# Patient Record
Sex: FEMALE | Race: ASIAN | Employment: OTHER | URBAN - METROPOLITAN AREA
[De-identification: names, ages, dates, MRNs, and addresses within clinical notes are randomized per-mention and may not be internally consistent; named-entity substitution may affect disease eponyms.]

---

## 2022-04-29 ENCOUNTER — APPOINTMENT (OUTPATIENT)
Dept: VASCULAR SURGERY | Age: 88
End: 2022-04-29
Attending: EMERGENCY MEDICINE
Payer: MEDICARE

## 2022-04-29 ENCOUNTER — HOSPITAL ENCOUNTER (EMERGENCY)
Age: 88
Discharge: HOME | End: 2022-04-30
Attending: EMERGENCY MEDICINE | Admitting: EMERGENCY MEDICINE
Payer: MEDICARE

## 2022-04-29 ENCOUNTER — APPOINTMENT (OUTPATIENT)
Dept: GENERAL RADIOLOGY | Age: 88
End: 2022-04-29
Attending: EMERGENCY MEDICINE
Payer: MEDICARE

## 2022-04-29 DIAGNOSIS — M79.89 LEG SWELLING: Primary | ICD-10-CM

## 2022-04-29 LAB
ALBUMIN SERPL-MCNC: 3.4 G/DL (ref 3.5–5)
ALBUMIN/GLOB SERPL: 0.8 {RATIO} (ref 1.1–2.2)
ALP SERPL-CCNC: 74 U/L (ref 45–117)
ALT SERPL-CCNC: 40 U/L (ref 12–78)
ANION GAP SERPL CALC-SCNC: 2 MMOL/L (ref 5–15)
AST SERPL-CCNC: 62 U/L (ref 15–37)
BASOPHILS # BLD: 0 K/UL (ref 0–0.1)
BASOPHILS NFR BLD: 1 % (ref 0–1)
BILIRUB SERPL-MCNC: 0.4 MG/DL (ref 0.2–1)
BUN SERPL-MCNC: 24 MG/DL (ref 6–20)
BUN/CREAT SERPL: 38 (ref 12–20)
CALCIUM SERPL-MCNC: 9 MG/DL (ref 8.5–10.1)
CHLORIDE SERPL-SCNC: 102 MMOL/L (ref 97–108)
CO2 SERPL-SCNC: 29 MMOL/L (ref 21–32)
COMMENT, HOLDF: NORMAL
CREAT SERPL-MCNC: 0.63 MG/DL (ref 0.55–1.02)
DIFFERENTIAL METHOD BLD: ABNORMAL
EOSINOPHIL # BLD: 0.2 K/UL (ref 0–0.4)
EOSINOPHIL NFR BLD: 2 % (ref 0–7)
ERYTHROCYTE [DISTWIDTH] IN BLOOD BY AUTOMATED COUNT: 14.1 % (ref 11.5–14.5)
GLOBULIN SER CALC-MCNC: 4.5 G/DL (ref 2–4)
GLUCOSE SERPL-MCNC: 109 MG/DL (ref 65–100)
HCT VFR BLD AUTO: 34.9 % (ref 35–47)
HGB BLD-MCNC: 11.4 G/DL (ref 11.5–16)
IMM GRANULOCYTES # BLD AUTO: 0 K/UL (ref 0–0.04)
IMM GRANULOCYTES NFR BLD AUTO: 0 % (ref 0–0.5)
LYMPHOCYTES # BLD: 1.4 K/UL (ref 0.8–3.5)
LYMPHOCYTES NFR BLD: 19 % (ref 12–49)
MCH RBC QN AUTO: 31 PG (ref 26–34)
MCHC RBC AUTO-ENTMCNC: 32.7 G/DL (ref 30–36.5)
MCV RBC AUTO: 94.8 FL (ref 80–99)
MONOCYTES # BLD: 0.6 K/UL (ref 0–1)
MONOCYTES NFR BLD: 8 % (ref 5–13)
NEUTS SEG # BLD: 5.3 K/UL (ref 1.8–8)
NEUTS SEG NFR BLD: 70 % (ref 32–75)
NRBC # BLD: 0 K/UL (ref 0–0.01)
NRBC BLD-RTO: 0 PER 100 WBC
PLATELET # BLD AUTO: 237 K/UL (ref 150–400)
PMV BLD AUTO: 10.9 FL (ref 8.9–12.9)
POTASSIUM SERPL-SCNC: 5.2 MMOL/L (ref 3.5–5.1)
PROT SERPL-MCNC: 7.9 G/DL (ref 6.4–8.2)
RBC # BLD AUTO: 3.68 M/UL (ref 3.8–5.2)
SAMPLES BEING HELD,HOLD: NORMAL
SODIUM SERPL-SCNC: 133 MMOL/L (ref 136–145)
TROPONIN-HIGH SENSITIVITY: 8 NG/L (ref 0–51)
WBC # BLD AUTO: 7.5 K/UL (ref 3.6–11)

## 2022-04-29 PROCEDURE — 36415 COLL VENOUS BLD VENIPUNCTURE: CPT

## 2022-04-29 PROCEDURE — 93971 EXTREMITY STUDY: CPT

## 2022-04-29 PROCEDURE — 84484 ASSAY OF TROPONIN QUANT: CPT

## 2022-04-29 PROCEDURE — 93005 ELECTROCARDIOGRAM TRACING: CPT

## 2022-04-29 PROCEDURE — 71046 X-RAY EXAM CHEST 2 VIEWS: CPT

## 2022-04-29 PROCEDURE — 80053 COMPREHEN METABOLIC PANEL: CPT

## 2022-04-29 PROCEDURE — 85025 COMPLETE CBC W/AUTO DIFF WBC: CPT

## 2022-04-29 PROCEDURE — 99285 EMERGENCY DEPT VISIT HI MDM: CPT

## 2022-04-29 PROCEDURE — 83880 ASSAY OF NATRIURETIC PEPTIDE: CPT

## 2022-04-29 NOTE — ED TRIAGE NOTES
TRIAGE NOTE:  Patient arrives ambulatory with cane. With c/o right foot, and calf swelling that started 3 days ago.

## 2022-04-30 VITALS
TEMPERATURE: 97.8 F | SYSTOLIC BLOOD PRESSURE: 159 MMHG | RESPIRATION RATE: 11 BRPM | OXYGEN SATURATION: 99 % | DIASTOLIC BLOOD PRESSURE: 54 MMHG | HEART RATE: 62 BPM

## 2022-04-30 LAB
ATRIAL RATE: 60 BPM
BNP SERPL-MCNC: 571 PG/ML
CALCULATED P AXIS, ECG09: 74 DEGREES
CALCULATED R AXIS, ECG10: 89 DEGREES
CALCULATED T AXIS, ECG11: 72 DEGREES
DIAGNOSIS, 93000: NORMAL
P-R INTERVAL, ECG05: 174 MS
Q-T INTERVAL, ECG07: 416 MS
QRS DURATION, ECG06: 80 MS
QTC CALCULATION (BEZET), ECG08: 416 MS
VENTRICULAR RATE, ECG03: 60 BPM

## 2022-04-30 RX ORDER — FUROSEMIDE 20 MG/1
20 TABLET ORAL DAILY
Qty: 7 TABLET | Refills: 0 | Status: SHIPPED | OUTPATIENT
Start: 2022-04-30 | End: 2022-05-07

## 2022-04-30 RX ORDER — FUROSEMIDE 20 MG/1
20 TABLET ORAL DAILY
Qty: 7 TABLET | Refills: 0 | Status: SHIPPED | OUTPATIENT
Start: 2022-04-30 | End: 2022-04-30 | Stop reason: SDUPTHER

## 2022-04-30 NOTE — ED PROVIDER NOTES
HPI     72-year-old female with a history of hypertension presents emergency department with concern for right leg swelling. Patient's son translates and states she had a Holter monitor placed about 8 days ago for dizziness. Dizziness has been a longstanding problem for her. She states she has discomfort around the site of the loop monitor and it hurts, she noticed it mostly when she lays down. She denies shortness of breath. She states she is actually had bilateral leg swelling since a loop monitor was placed but feels like it is more swollen on the right. She is not on a diuretic. She states she is urinating normally. Denies fever cough. Denies nausea vomiting. Her primary doctor sent her in to rule out a blood clot. There is been no travel. There is no history of DVT or PE. Past Medical History:   Diagnosis Date    Hypertension        No past surgical history on file. No family history on file. Social History     Socioeconomic History    Marital status:      Spouse name: Not on file    Number of children: Not on file    Years of education: Not on file    Highest education level: Not on file   Occupational History    Not on file   Tobacco Use    Smoking status: Not on file    Smokeless tobacco: Not on file   Substance and Sexual Activity    Alcohol use: Not on file    Drug use: Not on file    Sexual activity: Not on file   Other Topics Concern    Not on file   Social History Narrative    Not on file     Social Determinants of Health     Financial Resource Strain:     Difficulty of Paying Living Expenses: Not on file   Food Insecurity:     Worried About Running Out of Food in the Last Year: Not on file    Regine of Food in the Last Year: Not on file   Transportation Needs:     Lack of Transportation (Medical): Not on file    Lack of Transportation (Non-Medical):  Not on file   Physical Activity:     Days of Exercise per Week: Not on file    Minutes of Exercise per Session: Not on file   Stress:     Feeling of Stress : Not on file   Social Connections:     Frequency of Communication with Friends and Family: Not on file    Frequency of Social Gatherings with Friends and Family: Not on file    Attends [employer] Services: Not on file    Active Member of Clubs or Organizations: Not on file    Attends Club or Organization Meetings: Not on file    Marital Status: Not on file   Intimate Partner Violence:     Fear of Current or Ex-Partner: Not on file    Emotionally Abused: Not on file    Physically Abused: Not on file    Sexually Abused: Not on file   Housing Stability:     Unable to Pay for Housing in the Last Year: Not on file    Number of [place] in the Last Year: Not on file    Unstable Housing in the Last Year: Not on file         ALLERGIES: Patient has no known allergies. Review of Systems   Constitutional: Negative for fever. HENT: Negative for congestion. Eyes: Negative for visual disturbance. Respiratory: Negative for cough and shortness of breath. Cardiovascular: Positive for chest pain. Gastrointestinal: Negative for abdominal pain, nausea and vomiting. Endocrine: Negative for polyuria. Genitourinary: Negative for dysuria. Musculoskeletal: Negative for gait problem. Neurological: Negative for headaches. Psychiatric/Behavioral: Negative for dysphoric mood. Vitals:    04/29/22 1956   BP: (!) 183/75   Pulse: 67   Resp: 18   Temp: 97.8 °F (36.6 °C)   SpO2: 99%            Physical Exam  Constitutional:       General: She is not in acute distress. Appearance: She is well-developed. HENT:      Head: Normocephalic and atraumatic. Mouth/Throat:      Pharynx: No oropharyngeal exudate. Eyes:      General: No scleral icterus. Right eye: No discharge. Left eye: No discharge. Pupils: Pupils are equal, round, and reactive to light. Neck:      Vascular: No JVD.    Cardiovascular:      Rate and Rhythm: Normal rate and regular rhythm. Heart sounds: Normal heart sounds. No murmur heard. Pulmonary:      Effort: Pulmonary effort is normal. No respiratory distress. Breath sounds: Normal breath sounds. No stridor. No wheezing or rales. Chest:      Chest wall: No tenderness. Abdominal:      General: Bowel sounds are normal. There is no distension. Palpations: Abdomen is soft. There is no mass. Tenderness: There is no abdominal tenderness. There is no guarding or rebound. Musculoskeletal:         General: Normal range of motion. Cervical back: Normal range of motion and neck supple. Right lower leg: Edema present. Left lower leg: Edema present. Skin:     General: Skin is warm and dry. Capillary Refill: Capillary refill takes less than 2 seconds. Findings: No rash. Neurological:      Mental Status: She is oriented to person, place, and time. Psychiatric:         Behavior: Behavior normal.         Thought Content: Thought content normal.         Judgment: Judgment normal.          MDM       Procedures        ED EKG interpretation:  Rhythm: normal sinus rhythm; and regular . Rate (approx.): 60; Axis: normal; P wave: normal; QRS interval: normal ; ST/T wave: non-specific changes; . This EKG was interpreted by Syeda Colby MD,ED Provider. Work up is reassuring. Vitals are stable. CXR is clear, lungs are clear and denies SOB/orthopnea. BNP is mildly above normal at 571. Doppler is neg. Will start lasix po, encouraged close follow up w pcp next week for echo.  Return precautions provided

## 2022-04-30 NOTE — ED NOTES
I have reviewed discharge instructions with the patient and family member. The patient and family member verbalized understanding.

## 2022-04-30 NOTE — DISCHARGE INSTRUCTIONS
Work up in the emergency department was reassuring except for a mildly elevated BNP which can be seen with heart failure. I have sent a prescription for lasix to your pharmacy. Please call your doctor on Monday, let them know you were in the ED (THey should be able to review my note from this visit as well as the lab work and xrays)  In my opinion you should get an echocardiogram to evaluate heart pump function.      If you develop shortness of breath, especially when laying flat or with exertion, or worsening chest pain, return to the ED>

## 2024-12-24 ENCOUNTER — APPOINTMENT (OUTPATIENT)
Facility: HOSPITAL | Age: 89
End: 2024-12-24
Payer: MEDICARE

## 2024-12-24 ENCOUNTER — HOSPITAL ENCOUNTER (INPATIENT)
Facility: HOSPITAL | Age: 89
LOS: 4 days | Discharge: HOME HEALTH | End: 2024-12-28
Attending: EMERGENCY MEDICINE | Admitting: FAMILY MEDICINE
Payer: MEDICARE

## 2024-12-24 ENCOUNTER — APPOINTMENT (OUTPATIENT)
Facility: HOSPITAL | Age: 89
End: 2024-12-24
Attending: FAMILY MEDICINE
Payer: MEDICARE

## 2024-12-24 DIAGNOSIS — E87.1 HYPONATREMIA: Primary | ICD-10-CM

## 2024-12-24 DIAGNOSIS — R06.02 SHORTNESS OF BREATH: ICD-10-CM

## 2024-12-24 DIAGNOSIS — R60.0 BILATERAL LOWER EXTREMITY EDEMA: ICD-10-CM

## 2024-12-24 LAB
ALBUMIN SERPL-MCNC: 2.9 G/DL (ref 3.5–5)
ALBUMIN/GLOB SERPL: 0.8 (ref 1.1–2.2)
ALP SERPL-CCNC: 88 U/L (ref 45–117)
ALT SERPL-CCNC: 32 U/L (ref 12–78)
ANION GAP SERPL CALC-SCNC: 4 MMOL/L (ref 2–12)
ANION GAP SERPL CALC-SCNC: 6 MMOL/L (ref 2–12)
APPEARANCE UR: CLEAR
AST SERPL-CCNC: 48 U/L (ref 15–37)
BACTERIA URNS QL MICRO: NEGATIVE /HPF
BASOPHILS # BLD: 0 K/UL (ref 0–0.1)
BASOPHILS NFR BLD: 0 % (ref 0–1)
BILIRUB SERPL-MCNC: 0.5 MG/DL (ref 0.2–1)
BILIRUB UR QL: NEGATIVE
BUN SERPL-MCNC: 20 MG/DL (ref 6–20)
BUN SERPL-MCNC: 25 MG/DL (ref 6–20)
BUN/CREAT SERPL: 50 (ref 12–20)
BUN/CREAT SERPL: 50 (ref 12–20)
CALCIUM SERPL-MCNC: 7.9 MG/DL (ref 8.5–10.1)
CALCIUM SERPL-MCNC: 8.4 MG/DL (ref 8.5–10.1)
CHLORIDE SERPL-SCNC: 88 MMOL/L (ref 97–108)
CHLORIDE SERPL-SCNC: 92 MMOL/L (ref 97–108)
CO2 SERPL-SCNC: 29 MMOL/L (ref 21–32)
CO2 SERPL-SCNC: 29 MMOL/L (ref 21–32)
COLOR UR: ABNORMAL
COMMENT:: NORMAL
CREAT SERPL-MCNC: 0.4 MG/DL (ref 0.55–1.02)
CREAT SERPL-MCNC: 0.5 MG/DL (ref 0.55–1.02)
DIFFERENTIAL METHOD BLD: ABNORMAL
EOSINOPHIL # BLD: 0.1 K/UL (ref 0–0.4)
EOSINOPHIL NFR BLD: 1 % (ref 0–7)
EPITH CASTS URNS QL MICRO: ABNORMAL /LPF
ERYTHROCYTE [DISTWIDTH] IN BLOOD BY AUTOMATED COUNT: 14 % (ref 11.5–14.5)
FLUAV RNA SPEC QL NAA+PROBE: NOT DETECTED
FLUBV RNA SPEC QL NAA+PROBE: NOT DETECTED
FOLATE SERPL-MCNC: 26.9 NG/ML (ref 5–21)
GLOBULIN SER CALC-MCNC: 3.7 G/DL (ref 2–4)
GLUCOSE SERPL-MCNC: 147 MG/DL (ref 65–100)
GLUCOSE SERPL-MCNC: 86 MG/DL (ref 65–100)
GLUCOSE UR STRIP.AUTO-MCNC: NEGATIVE MG/DL
HCT VFR BLD AUTO: 31.1 % (ref 35–47)
HGB BLD-MCNC: 10.6 G/DL (ref 11.5–16)
HGB UR QL STRIP: ABNORMAL
HYALINE CASTS URNS QL MICRO: ABNORMAL /LPF (ref 0–5)
IMM GRANULOCYTES # BLD AUTO: 0 K/UL (ref 0–0.04)
IMM GRANULOCYTES NFR BLD AUTO: 0 % (ref 0–0.5)
IRON SATN MFR SERPL: 54 % (ref 20–50)
IRON SERPL-MCNC: 192 UG/DL (ref 35–150)
KETONES UR QL STRIP.AUTO: NEGATIVE MG/DL
LEUKOCYTE ESTERASE UR QL STRIP.AUTO: NEGATIVE
LYMPHOCYTES # BLD: 0.8 K/UL (ref 0.8–3.5)
LYMPHOCYTES NFR BLD: 14 % (ref 12–49)
MCH RBC QN AUTO: 31.1 PG (ref 26–34)
MCHC RBC AUTO-ENTMCNC: 34.1 G/DL (ref 30–36.5)
MCV RBC AUTO: 91.2 FL (ref 80–99)
MONOCYTES # BLD: 0.8 K/UL (ref 0–1)
MONOCYTES NFR BLD: 14 % (ref 5–13)
NEUTS SEG # BLD: 4.1 K/UL (ref 1.8–8)
NEUTS SEG NFR BLD: 71 % (ref 32–75)
NITRITE UR QL STRIP.AUTO: NEGATIVE
NRBC # BLD: 0 K/UL (ref 0–0.01)
NRBC BLD-RTO: 0 PER 100 WBC
NT PRO BNP: 2650 PG/ML
OSMOLALITY SERPL: 259 MOSM/KG H2O
OSMOLALITY UR: 353 MOSM/KG H2O
PH UR STRIP: 7 (ref 5–8)
PLATELET # BLD AUTO: 244 K/UL (ref 150–400)
PMV BLD AUTO: 9.2 FL (ref 8.9–12.9)
POTASSIUM SERPL-SCNC: 3.6 MMOL/L (ref 3.5–5.1)
POTASSIUM SERPL-SCNC: 4.5 MMOL/L (ref 3.5–5.1)
PROT SERPL-MCNC: 6.6 G/DL (ref 6.4–8.2)
PROT UR STRIP-MCNC: NEGATIVE MG/DL
RBC # BLD AUTO: 3.41 M/UL (ref 3.8–5.2)
RBC #/AREA URNS HPF: ABNORMAL /HPF (ref 0–5)
SARS-COV-2 RNA RESP QL NAA+PROBE: NOT DETECTED
SODIUM SERPL-SCNC: 121 MMOL/L (ref 136–145)
SODIUM SERPL-SCNC: 127 MMOL/L (ref 136–145)
SODIUM UR-SCNC: 63 MMOL/L
SOURCE: NORMAL
SP GR UR REFRACTOMETRY: 1.01 (ref 1–1.03)
SPECIMEN HOLD: NORMAL
TIBC SERPL-MCNC: 353 UG/DL (ref 250–450)
UROBILINOGEN UR QL STRIP.AUTO: 0.2 EU/DL (ref 0.2–1)
VIT B12 SERPL-MCNC: 1479 PG/ML (ref 193–986)
WBC # BLD AUTO: 5.8 K/UL (ref 3.6–11)
WBC URNS QL MICRO: ABNORMAL /HPF (ref 0–4)

## 2024-12-24 PROCEDURE — 83930 ASSAY OF BLOOD OSMOLALITY: CPT

## 2024-12-24 PROCEDURE — 81001 URINALYSIS AUTO W/SCOPE: CPT

## 2024-12-24 PROCEDURE — 83550 IRON BINDING TEST: CPT

## 2024-12-24 PROCEDURE — 82607 VITAMIN B-12: CPT

## 2024-12-24 PROCEDURE — 82746 ASSAY OF FOLIC ACID SERUM: CPT

## 2024-12-24 PROCEDURE — 6360000002 HC RX W HCPCS: Performed by: FAMILY MEDICINE

## 2024-12-24 PROCEDURE — 83935 ASSAY OF URINE OSMOLALITY: CPT

## 2024-12-24 PROCEDURE — 93970 EXTREMITY STUDY: CPT

## 2024-12-24 PROCEDURE — 36415 COLL VENOUS BLD VENIPUNCTURE: CPT

## 2024-12-24 PROCEDURE — 84300 ASSAY OF URINE SODIUM: CPT

## 2024-12-24 PROCEDURE — 83540 ASSAY OF IRON: CPT

## 2024-12-24 PROCEDURE — 85025 COMPLETE CBC W/AUTO DIFF WBC: CPT

## 2024-12-24 PROCEDURE — 71045 X-RAY EXAM CHEST 1 VIEW: CPT

## 2024-12-24 PROCEDURE — 2060000000 HC ICU INTERMEDIATE R&B

## 2024-12-24 PROCEDURE — 80053 COMPREHEN METABOLIC PANEL: CPT

## 2024-12-24 PROCEDURE — 99285 EMERGENCY DEPT VISIT HI MDM: CPT

## 2024-12-24 PROCEDURE — 2500000003 HC RX 250 WO HCPCS: Performed by: FAMILY MEDICINE

## 2024-12-24 PROCEDURE — 83880 ASSAY OF NATRIURETIC PEPTIDE: CPT

## 2024-12-24 PROCEDURE — 87636 SARSCOV2 & INF A&B AMP PRB: CPT

## 2024-12-24 RX ORDER — SODIUM CHLORIDE 9 MG/ML
INJECTION, SOLUTION INTRAVENOUS PRN
Status: DISCONTINUED | OUTPATIENT
Start: 2024-12-24 | End: 2024-12-28 | Stop reason: HOSPADM

## 2024-12-24 RX ORDER — ACETAMINOPHEN 650 MG/1
650 SUPPOSITORY RECTAL EVERY 6 HOURS PRN
Status: DISCONTINUED | OUTPATIENT
Start: 2024-12-24 | End: 2024-12-24

## 2024-12-24 RX ORDER — SODIUM CHLORIDE 0.9 % (FLUSH) 0.9 %
5-40 SYRINGE (ML) INJECTION EVERY 12 HOURS SCHEDULED
Status: DISCONTINUED | OUTPATIENT
Start: 2024-12-24 | End: 2024-12-28 | Stop reason: HOSPADM

## 2024-12-24 RX ORDER — ENOXAPARIN SODIUM 100 MG/ML
30 INJECTION SUBCUTANEOUS DAILY
Status: DISCONTINUED | OUTPATIENT
Start: 2024-12-24 | End: 2024-12-28 | Stop reason: HOSPADM

## 2024-12-24 RX ORDER — BENZONATATE 100 MG/1
100 CAPSULE ORAL 3 TIMES DAILY PRN
Status: DISCONTINUED | OUTPATIENT
Start: 2024-12-24 | End: 2024-12-25

## 2024-12-24 RX ORDER — ACETAMINOPHEN 325 MG/1
650 TABLET ORAL EVERY 6 HOURS PRN
Status: DISCONTINUED | OUTPATIENT
Start: 2024-12-24 | End: 2024-12-24

## 2024-12-24 RX ORDER — POLYETHYLENE GLYCOL 3350 17 G/17G
17 POWDER, FOR SOLUTION ORAL DAILY PRN
Status: DISCONTINUED | OUTPATIENT
Start: 2024-12-24 | End: 2024-12-28 | Stop reason: HOSPADM

## 2024-12-24 RX ORDER — SODIUM CHLORIDE 0.9 % (FLUSH) 0.9 %
5-40 SYRINGE (ML) INJECTION PRN
Status: DISCONTINUED | OUTPATIENT
Start: 2024-12-24 | End: 2024-12-28 | Stop reason: HOSPADM

## 2024-12-24 RX ADMIN — ENOXAPARIN SODIUM 30 MG: 100 INJECTION SUBCUTANEOUS at 22:56

## 2024-12-24 RX ADMIN — SODIUM CHLORIDE, PRESERVATIVE FREE 10 ML: 5 INJECTION INTRAVENOUS at 22:56

## 2024-12-24 ASSESSMENT — PAIN - FUNCTIONAL ASSESSMENT: PAIN_FUNCTIONAL_ASSESSMENT: NONE - DENIES PAIN

## 2024-12-24 NOTE — ED NOTES
Referred by PCP due to Na of 118 earlier today at [facility].  Feeling confused/ disoriented, fatigued/tired as of lately.  Minimal cough, denies fever.  Was recently of furosemide.  Denies chest pain/sob.      6:22 PM  I have evaluated the patient as the Provider in Rapid Medical Evaluation (RME). I have reviewed her vital signs and the triage nurse assessment. I have talked with the patient and any available family and advised that I am the provider in triage and have ordered the appropriate study to initiate their work up based on the clinical presentation during my assessment. I have advised that the patient will be accommodated in the Main ED as soon as possible. I have also requested to contact the triage nurse or myself immediately if the patient experiences any changes in their condition during this brief waiting period.  LANDY Hewitt, Bravo ESCALERA PA-C  12/24/24 1827

## 2024-12-24 NOTE — ED TRIAGE NOTES
Pt was brought to ED by son due to low sodium. Pt has been having a cough that has been on going for the past couple weeks. Denies chest pain, SOB, dizziness, nor vision changed. Pt has been having extremities swelling. Pt has gotten more confused over the last two months.

## 2024-12-25 ENCOUNTER — APPOINTMENT (OUTPATIENT)
Facility: HOSPITAL | Age: 89
End: 2024-12-25
Attending: FAMILY MEDICINE
Payer: MEDICARE

## 2024-12-25 ENCOUNTER — APPOINTMENT (OUTPATIENT)
Facility: HOSPITAL | Age: 89
End: 2024-12-25
Payer: MEDICARE

## 2024-12-25 LAB
ALBUMIN SERPL-MCNC: 2.6 G/DL (ref 3.5–5)
ALBUMIN/GLOB SERPL: 0.8 (ref 1.1–2.2)
ALP SERPL-CCNC: 78 U/L (ref 45–117)
ALT SERPL-CCNC: 24 U/L (ref 12–78)
ANION GAP SERPL CALC-SCNC: 4 MMOL/L (ref 2–12)
APTT PPP: 30.9 SEC (ref 22.1–31)
AST SERPL-CCNC: 24 U/L (ref 15–37)
BASOPHILS # BLD: 0 K/UL (ref 0–0.1)
BASOPHILS NFR BLD: 1 % (ref 0–1)
BILIRUB SERPL-MCNC: 0.5 MG/DL (ref 0.2–1)
BUN SERPL-MCNC: 19 MG/DL (ref 6–20)
BUN/CREAT SERPL: 54 (ref 12–20)
CALCIUM SERPL-MCNC: 7.8 MG/DL (ref 8.5–10.1)
CHLORIDE SERPL-SCNC: 95 MMOL/L (ref 97–108)
CHOLEST SERPL-MCNC: 114 MG/DL
CO2 SERPL-SCNC: 29 MMOL/L (ref 21–32)
CREAT SERPL-MCNC: 0.35 MG/DL (ref 0.55–1.02)
DIFFERENTIAL METHOD BLD: ABNORMAL
ECHO AO ROOT DIAM: 2.7 CM
ECHO AO ROOT INDEX: 1.97 CM/M2
ECHO AV AREA PEAK VELOCITY: 0.9 CM2
ECHO AV AREA VTI: 0.9 CM2
ECHO AV AREA/BSA PEAK VELOCITY: 0.7 CM2/M2
ECHO AV AREA/BSA VTI: 0.7 CM2/M2
ECHO AV MEAN GRADIENT: 11 MMHG
ECHO AV MEAN VELOCITY: 1.6 M/S
ECHO AV PEAK GRADIENT: 18 MMHG
ECHO AV PEAK VELOCITY: 2.1 M/S
ECHO AV VELOCITY RATIO: 0.43
ECHO AV VTI: 53.4 CM
ECHO BSA: 1.34 M2
ECHO EST RA PRESSURE: 3 MMHG
ECHO LA DIAMETER INDEX: 2.04 CM/M2
ECHO LA DIAMETER: 2.8 CM
ECHO LA TO AORTIC ROOT RATIO: 1.04
ECHO LA VOL A-L A2C: 46 ML (ref 22–52)
ECHO LA VOL A-L A4C: 40 ML (ref 22–52)
ECHO LA VOL MOD A2C: 44 ML (ref 22–52)
ECHO LA VOL MOD A4C: 37 ML (ref 22–52)
ECHO LA VOLUME AREA LENGTH: 44 ML
ECHO LA VOLUME INDEX A-L A2C: 34 ML/M2 (ref 16–34)
ECHO LA VOLUME INDEX A-L A4C: 29 ML/M2 (ref 16–34)
ECHO LA VOLUME INDEX AREA LENGTH: 32 ML/M2 (ref 16–34)
ECHO LA VOLUME INDEX MOD A2C: 32 ML/M2 (ref 16–34)
ECHO LA VOLUME INDEX MOD A4C: 27 ML/M2 (ref 16–34)
ECHO LV E' LATERAL VELOCITY: 3.55 CM/S
ECHO LV E' SEPTAL VELOCITY: 5.32 CM/S
ECHO LV EDV A2C: 49 ML
ECHO LV EDV A4C: 68 ML
ECHO LV EDV BP: 59 ML (ref 56–104)
ECHO LV EDV INDEX A4C: 50 ML/M2
ECHO LV EDV INDEX BP: 43 ML/M2
ECHO LV EDV NDEX A2C: 36 ML/M2
ECHO LV EJECTION FRACTION A2C: 58 %
ECHO LV EJECTION FRACTION A4C: 51 %
ECHO LV EJECTION FRACTION BIPLANE: 54 % (ref 55–100)
ECHO LV ESV A2C: 21 ML
ECHO LV ESV A4C: 33 ML
ECHO LV ESV BP: 27 ML (ref 19–49)
ECHO LV ESV INDEX A2C: 15 ML/M2
ECHO LV ESV INDEX A4C: 24 ML/M2
ECHO LV ESV INDEX BP: 20 ML/M2
ECHO LV FRACTIONAL SHORTENING: 33 % (ref 28–44)
ECHO LV INTERNAL DIMENSION DIASTOLE INDEX: 2.92 CM/M2
ECHO LV INTERNAL DIMENSION DIASTOLIC: 4 CM (ref 3.9–5.3)
ECHO LV INTERNAL DIMENSION SYSTOLIC INDEX: 1.97 CM/M2
ECHO LV INTERNAL DIMENSION SYSTOLIC: 2.7 CM
ECHO LV IVSD: 0.9 CM (ref 0.6–0.9)
ECHO LV MASS 2D: 93.5 G (ref 67–162)
ECHO LV MASS INDEX 2D: 68.2 G/M2 (ref 43–95)
ECHO LV POSTERIOR WALL DIASTOLIC: 0.7 CM (ref 0.6–0.9)
ECHO LV RELATIVE WALL THICKNESS RATIO: 0.35
ECHO LVOT AREA: 2 CM2
ECHO LVOT AV VTI INDEX: 0.42
ECHO LVOT DIAM: 1.6 CM
ECHO LVOT MEAN GRADIENT: 2 MMHG
ECHO LVOT PEAK GRADIENT: 3 MMHG
ECHO LVOT PEAK VELOCITY: 0.9 M/S
ECHO LVOT STROKE VOLUME INDEX: 33.2 ML/M2
ECHO LVOT SV: 45.4 ML
ECHO LVOT VTI: 22.6 CM
ECHO MV A VELOCITY: 1.23 M/S
ECHO MV AREA PHT: 4.1 CM2
ECHO MV E DECELERATION TIME (DT): 183.3 MS
ECHO MV E VELOCITY: 1.1 M/S
ECHO MV E/A RATIO: 0.89
ECHO MV E/E' LATERAL: 30.99
ECHO MV E/E' RATIO (AVERAGED): 25.83
ECHO MV E/E' SEPTAL: 20.68
ECHO MV PRESSURE HALF TIME (PHT): 53.2 MS
ECHO PV MAX VELOCITY: 1.1 M/S
ECHO PV PEAK GRADIENT: 4 MMHG
ECHO RIGHT VENTRICULAR SYSTOLIC PRESSURE (RVSP): 38 MMHG
ECHO RV TAPSE: 2.5 CM (ref 1.7–?)
ECHO TV REGURGITANT MAX VELOCITY: 2.94 M/S
ECHO TV REGURGITANT PEAK GRADIENT: 35 MMHG
EOSINOPHIL # BLD: 0.1 K/UL (ref 0–0.4)
EOSINOPHIL NFR BLD: 2 % (ref 0–7)
ERYTHROCYTE [DISTWIDTH] IN BLOOD BY AUTOMATED COUNT: 14 % (ref 11.5–14.5)
GLOBULIN SER CALC-MCNC: 3.2 G/DL (ref 2–4)
GLUCOSE SERPL-MCNC: 84 MG/DL (ref 65–100)
HCT VFR BLD AUTO: 29.2 % (ref 35–47)
HDLC SERPL-MCNC: 79 MG/DL
HDLC SERPL: 1.4 (ref 0–5)
HGB BLD-MCNC: 10 G/DL (ref 11.5–16)
IMM GRANULOCYTES # BLD AUTO: 0 K/UL (ref 0–0.04)
IMM GRANULOCYTES NFR BLD AUTO: 1 % (ref 0–0.5)
INR PPP: 1.1 (ref 0.9–1.1)
LDLC SERPL CALC-MCNC: 30.6 MG/DL (ref 0–100)
LYMPHOCYTES # BLD: 1.2 K/UL (ref 0.8–3.5)
LYMPHOCYTES NFR BLD: 22 % (ref 12–49)
MAGNESIUM SERPL-MCNC: 2 MG/DL (ref 1.6–2.4)
MCH RBC QN AUTO: 31.3 PG (ref 26–34)
MCHC RBC AUTO-ENTMCNC: 34.2 G/DL (ref 30–36.5)
MCV RBC AUTO: 91.5 FL (ref 80–99)
MONOCYTES # BLD: 1 K/UL (ref 0–1)
MONOCYTES NFR BLD: 19 % (ref 5–13)
NEUTS SEG # BLD: 3 K/UL (ref 1.8–8)
NEUTS SEG NFR BLD: 55 % (ref 32–75)
NRBC # BLD: 0 K/UL (ref 0–0.01)
NRBC BLD-RTO: 0 PER 100 WBC
OSMOLALITY UR: 178 MOSM/KG H2O
PHOSPHATE SERPL-MCNC: 3.2 MG/DL (ref 2.6–4.7)
PLATELET # BLD AUTO: 205 K/UL (ref 150–400)
PMV BLD AUTO: 8.8 FL (ref 8.9–12.9)
POTASSIUM SERPL-SCNC: 3.4 MMOL/L (ref 3.5–5.1)
PROT SERPL-MCNC: 5.8 G/DL (ref 6.4–8.2)
PROTHROMBIN TIME: 11.2 SEC (ref 9–11.1)
RBC # BLD AUTO: 3.19 M/UL (ref 3.8–5.2)
SODIUM SERPL-SCNC: 128 MMOL/L (ref 136–145)
SODIUM UR-SCNC: 45 MMOL/L
SPECIMEN HOLD: NORMAL
THERAPEUTIC RANGE: NORMAL SECS (ref 58–77)
TRIGL SERPL-MCNC: 22 MG/DL
TSH SERPL DL<=0.05 MIU/L-ACNC: 3.77 UIU/ML (ref 0.36–3.74)
VLDLC SERPL CALC-MCNC: 4.4 MG/DL
WBC # BLD AUTO: 5.3 K/UL (ref 3.6–11)

## 2024-12-25 PROCEDURE — 80061 LIPID PANEL: CPT

## 2024-12-25 PROCEDURE — 6370000000 HC RX 637 (ALT 250 FOR IP): Performed by: HOSPITALIST

## 2024-12-25 PROCEDURE — 36415 COLL VENOUS BLD VENIPUNCTURE: CPT

## 2024-12-25 PROCEDURE — 1100000000 HC RM PRIVATE

## 2024-12-25 PROCEDURE — 83935 ASSAY OF URINE OSMOLALITY: CPT

## 2024-12-25 PROCEDURE — 85025 COMPLETE CBC W/AUTO DIFF WBC: CPT

## 2024-12-25 PROCEDURE — 2500000003 HC RX 250 WO HCPCS: Performed by: FAMILY MEDICINE

## 2024-12-25 PROCEDURE — 80053 COMPREHEN METABOLIC PANEL: CPT

## 2024-12-25 PROCEDURE — 6370000000 HC RX 637 (ALT 250 FOR IP): Performed by: FAMILY MEDICINE

## 2024-12-25 PROCEDURE — 84100 ASSAY OF PHOSPHORUS: CPT

## 2024-12-25 PROCEDURE — 85730 THROMBOPLASTIN TIME PARTIAL: CPT

## 2024-12-25 PROCEDURE — 6360000002 HC RX W HCPCS: Performed by: FAMILY MEDICINE

## 2024-12-25 PROCEDURE — 93306 TTE W/DOPPLER COMPLETE: CPT | Performed by: SPECIALIST

## 2024-12-25 PROCEDURE — 84443 ASSAY THYROID STIM HORMONE: CPT

## 2024-12-25 PROCEDURE — 93306 TTE W/DOPPLER COMPLETE: CPT

## 2024-12-25 PROCEDURE — 70490 CT SOFT TISSUE NECK W/O DYE: CPT

## 2024-12-25 PROCEDURE — 70450 CT HEAD/BRAIN W/O DYE: CPT

## 2024-12-25 PROCEDURE — 85610 PROTHROMBIN TIME: CPT

## 2024-12-25 PROCEDURE — 83735 ASSAY OF MAGNESIUM: CPT

## 2024-12-25 PROCEDURE — 6360000002 HC RX W HCPCS: Performed by: HOSPITALIST

## 2024-12-25 PROCEDURE — 84300 ASSAY OF URINE SODIUM: CPT

## 2024-12-25 RX ORDER — CALCIUM CARBONATE 500 MG/1
500 TABLET, CHEWABLE ORAL DAILY
Status: DISCONTINUED | OUTPATIENT
Start: 2024-12-25 | End: 2024-12-28 | Stop reason: HOSPADM

## 2024-12-25 RX ORDER — BENZONATATE 100 MG/1
200 CAPSULE ORAL 3 TIMES DAILY
Status: DISCONTINUED | OUTPATIENT
Start: 2024-12-25 | End: 2024-12-28 | Stop reason: HOSPADM

## 2024-12-25 RX ORDER — BISACODYL 10 MG
10 SUPPOSITORY, RECTAL RECTAL DAILY PRN
Status: DISCONTINUED | OUTPATIENT
Start: 2024-12-25 | End: 2024-12-28 | Stop reason: HOSPADM

## 2024-12-25 RX ORDER — METOPROLOL TARTRATE 25 MG/1
100 TABLET, FILM COATED ORAL 2 TIMES DAILY
Status: DISCONTINUED | OUTPATIENT
Start: 2024-12-25 | End: 2024-12-28 | Stop reason: HOSPADM

## 2024-12-25 RX ORDER — LOSARTAN POTASSIUM 100 MG/1
100 TABLET ORAL DAILY
COMMUNITY
Start: 2024-10-15 | End: 2025-10-15

## 2024-12-25 RX ORDER — LOSARTAN POTASSIUM 50 MG/1
100 TABLET ORAL DAILY
Status: DISCONTINUED | OUTPATIENT
Start: 2024-12-25 | End: 2024-12-28 | Stop reason: HOSPADM

## 2024-12-25 RX ORDER — FERROUS SULFATE 324(65)MG
324 TABLET, DELAYED RELEASE (ENTERIC COATED) ORAL
COMMUNITY
Start: 2024-08-06 | End: 2025-08-06

## 2024-12-25 RX ORDER — AZITHROMYCIN 250 MG/1
500 TABLET, FILM COATED ORAL DAILY
Status: DISCONTINUED | OUTPATIENT
Start: 2024-12-25 | End: 2024-12-28 | Stop reason: HOSPADM

## 2024-12-25 RX ORDER — VITAMIN B COMPLEX
1000 TABLET ORAL DAILY
Status: DISCONTINUED | OUTPATIENT
Start: 2024-12-25 | End: 2024-12-28 | Stop reason: HOSPADM

## 2024-12-25 RX ORDER — METOPROLOL TARTRATE 100 MG/1
100 TABLET ORAL 2 TIMES DAILY
COMMUNITY
Start: 2024-10-15

## 2024-12-25 RX ORDER — PANTOPRAZOLE SODIUM 40 MG/1
40 TABLET, DELAYED RELEASE ORAL
Status: DISCONTINUED | OUTPATIENT
Start: 2024-12-25 | End: 2024-12-28 | Stop reason: HOSPADM

## 2024-12-25 RX ORDER — FERROUS SULFATE 325(65) MG
325 TABLET ORAL
Status: DISCONTINUED | OUTPATIENT
Start: 2024-12-25 | End: 2024-12-28 | Stop reason: HOSPADM

## 2024-12-25 RX ORDER — CLOBETASOL PROPIONATE 0.5 MG/G
60 OINTMENT TOPICAL 2 TIMES DAILY
COMMUNITY
Start: 2024-12-03

## 2024-12-25 RX ORDER — HYDRALAZINE HYDROCHLORIDE 20 MG/ML
10 INJECTION INTRAMUSCULAR; INTRAVENOUS EVERY 6 HOURS PRN
Status: DISCONTINUED | OUTPATIENT
Start: 2024-12-25 | End: 2024-12-28 | Stop reason: HOSPADM

## 2024-12-25 RX ADMIN — SODIUM CHLORIDE, PRESERVATIVE FREE 10 ML: 5 INJECTION INTRAVENOUS at 20:13

## 2024-12-25 RX ADMIN — HYDRALAZINE HYDROCHLORIDE 10 MG: 20 INJECTION INTRAMUSCULAR; INTRAVENOUS at 13:29

## 2024-12-25 RX ADMIN — PANTOPRAZOLE SODIUM 40 MG: 40 TABLET, DELAYED RELEASE ORAL at 09:05

## 2024-12-25 RX ADMIN — BENZONATATE 200 MG: 100 CAPSULE ORAL at 20:12

## 2024-12-25 RX ADMIN — Medication 1000 UNITS: at 09:03

## 2024-12-25 RX ADMIN — LOSARTAN POTASSIUM 100 MG: 50 TABLET, FILM COATED ORAL at 09:03

## 2024-12-25 RX ADMIN — ENOXAPARIN SODIUM 30 MG: 100 INJECTION SUBCUTANEOUS at 09:03

## 2024-12-25 RX ADMIN — AZITHROMYCIN DIHYDRATE 500 MG: 250 TABLET, FILM COATED ORAL at 14:23

## 2024-12-25 RX ADMIN — FERROUS SULFATE TAB 325 MG (65 MG ELEMENTAL FE) 325 MG: 325 (65 FE) TAB at 09:03

## 2024-12-25 RX ADMIN — POLYETHYLENE GLYCOL 3350 17 G: 17 POWDER, FOR SOLUTION ORAL at 18:29

## 2024-12-25 RX ADMIN — BENZONATATE 200 MG: 100 CAPSULE ORAL at 14:23

## 2024-12-25 RX ADMIN — CALCIUM CARBONATE (ANTACID) CHEW TAB 500 MG 500 MG: 500 CHEW TAB at 09:03

## 2024-12-25 RX ADMIN — METOPROLOL TARTRATE 100 MG: 50 TABLET, FILM COATED ORAL at 09:03

## 2024-12-25 RX ADMIN — BENZONATATE 200 MG: 100 CAPSULE ORAL at 09:03

## 2024-12-25 RX ADMIN — POTASSIUM BICARBONATE 40 MEQ: 782 TABLET, EFFERVESCENT ORAL at 09:03

## 2024-12-25 RX ADMIN — SODIUM CHLORIDE, PRESERVATIVE FREE 10 ML: 5 INJECTION INTRAVENOUS at 09:04

## 2024-12-25 RX ADMIN — METOPROLOL TARTRATE 100 MG: 50 TABLET, FILM COATED ORAL at 20:12

## 2024-12-25 NOTE — CONSULTS
[facility]             Leatha Rubi MD             [phone]        NEPHROLOGY CONSULT NOTE     Patient: Steve Gibbons MRN: [id number]  PCP: Rosa Alvarado MD   :     1931  Age:   89 y.o.  Sex:  female      Referring physician: Yane Frost MD  Reason for consultation: 89 y.o. female with Shortness of breath [R06.02]  Acute hyponatremia [E87.1]  Hyponatremia [E87.1]  Bilateral lower extremity edema [R60.0] complicated by DEVI   Admission Date: 2024  6:44 PM  LOS: 1 day     DISCUSSION / PLAN :      Assessment:    Hyponatremia probably due to diuretics.  Urine osmolality is low which is consistent with using diuretics also can be due to high water intake.  Unable to get any history from the patient even with   -She is also on losartan which can cause hyponatremia.  -Hyponatremia improving with holding Lasix and fluid restriction    Edema, low albumin-UA neg for protein    Hypokalemia  Contraction alkalosis      Plan:    -Continue with current management  -Follow-up serum sodium  -Replete potassium  -Check magnesium  -Fluid restriction 1200 cc  -Check TSH and lipid panel  -check UPCR           Active Problems / Assessment AAActive  :   Principal Problem:    Acute hyponatremia  Resolved Problems:    * No resolved hospital problems. *       Subjective:   HPI: Steve Gibbons is a 89 y.o. female who has been admitted to the hospital for hyponatremia.  Patient has dementia.  I used a  but unable to give any history from the patient.  She was started recently on Lasix per records for leg edema for 5 days.  She had labs yesterday and her sodium reported 118 and was sent to ER.  In ER her serum sodium was 121.  Vital signs were stable.  Urine osmolality was 353 last night and 178 this morning.  Serum sodium has improved to 128 today.      Past Medical Hx:   Past Medical History:   Diagnosis Date    Hypertension         Past Surgical Hx:   No past surgical history on  Volume A-L A4C 40 22 - 52 mL    LA Volume MOD A2C 44 22 - 52 mL    LA Volume MOD A4C 37 22 - 52 mL    AV Area by Peak Velocity 0.9 cm2    AV Area by VTI 0.9 cm2    AV Peak Gradient 18 mmHg    AV Mean Gradient 11 mmHg    AV Peak Velocity 2.1 m/s    AV Mean Velocity 1.6 m/s    AV VTI 53.4 cm    MV A Velocity 1.23 m/s    MV E Wave Deceleration Time 183.3 ms    MV E Velocity 1.10 m/s    LV E' Lateral Velocity 3.55 cm/s    LV E' Septal Velocity 5.32 cm/s    MV PHT 53.2 ms    MV Area by PHT 4.1 cm2    PV Peak Gradient 4 mmHg    PV Max Velocity 1.1 m/s    TAPSE 2.5 1.7 cm    TR Peak Gradient 35 mmHg    TR Max Velocity 2.94 m/s    Aortic Root 2.7 cm    Body Surface Area 1.34 m2    Fractional Shortening 2D 33 28 - 44 %    LV ESV Index BP 20 mL/m2    LV EDV Index BP 43 mL/m2    LV ESV Index A4C 24 mL/m2    LV EDV Index A4C 50 mL/m2    LV ESV Index A2C 15 mL/m2    LV EDV Index A2C 36 mL/m2    LVIDd Index 2.92 cm/m2    LVIDs Index 1.97 cm/m2    LV RWT Ratio 0.35     LV Mass 2D 93.5 67 - 162 g    LV Mass 2D Index 68.2 43 - 95 g/m2    MV E/A 0.89     E/E' Ratio (Averaged) 25.83     E/E' Lateral 30.99     E/E' Septal 20.68     LA Volume Index A/L 32 16 - 34 mL/m2    LVOT Stroke Volume Index 33.2 mL/m2    LVOT Area 2.0 cm2    LA Volume Index A-L A2C 34 16 - 34 mL/m2    LA Volume Index A-L A4C 29 16 - 34 mL/m2    LA Volume Index MOD A2C 32 16 - 34 ml/m2    LA Volume Index MOD A4C 27 16 - 34 ml/m2    LA Size Index 2.04 cm/m2    LA/AO Root Ratio 1.04     Ao Root Index 1.97 cm/m2    AV Velocity Ratio 0.43     LVOT:AV VTI Index 0.42     CEZAR/BSA VTI 0.7 cm2/m2    CEZAR/BSA Peak Velocity 0.7 cm2/m2        Lab Results   Component Value Date    CREATININE 0.35 (L) 12/25/2024    BUN 19 12/25/2024     (L) 12/25/2024    K 3.4 (L) 12/25/2024    CL 95 (L) 12/25/2024    CO2 29 12/25/2024       Lab Results   Component Value Date    CREATININE 0.35 (L) 12/25/2024    CREATININE 0.40 (L) 12/24/2024    CREATININE 0.50 (L) 12/24/2024    CREATININE  0.63 04/29/2022    BUN 19 12/25/2024    BUN 20 12/24/2024    BUN 25 (H) 12/24/2024    BUN 24 (H) 04/29/2022    K 3.4 (L) 12/25/2024    K 3.6 12/24/2024    K 4.5 12/24/2024    K 5.2 (H) 04/29/2022       Lab Results   Component Value Date    WBC 5.3 12/25/2024    RBC 3.19 (L) 12/25/2024    HGB 10.0 (L) 12/25/2024    HCT 29.2 (L) 12/25/2024    MCV 91.5 12/25/2024    MCH 31.3 12/25/2024    RDW 14.0 12/25/2024     12/25/2024       Lab Results   Component Value Date    CALCIUM 7.8 (L) 12/25/2024    PHOS 3.2 12/25/2024       Urine dipstick:   No results found for: \"COLOR\", \"CASTS\"    I have reviewed the following:   All pertinent labs, microbiology data, radiology imaging for my assessment     ECG- Rev: Yes / No  Xray/CT/US/MRI REV: Yes/ No    Care Plan discussed with:  nurse     Chart reviewed.  Total time spent with patient including JULIETH:  45 min  Medications list Personally Reviewed   [x]      Yes     []               No      Thank you for allowing us to participate in the care of this patient.   We will follow patient. Please don’t hesitate to call with any questions    ZHEN DAWSON MD  12/25/2024    A total time of 45 minutes was spent on today's encounter.  Greater than 50% of the time was spent on the following:  Preparing for visit and chart review.  Obtaining and/or reviewing separately obtained history  Performing a medically appropriate exam and/or evaluation  Counseling and educating a patient/family/caregiver as noted above  Placing relevant orders  Referring and communicating with other professionals (not separately reported)  Independently interpreting results (not separately reported) and communicating results to the patient/family/caregiver  Care coordination (not separately reported) as noted above  Documenting clinical information in the electronic health records (e.g. problem list, visit note) on the day of the encounter

## 2024-12-25 NOTE — ED PROVIDER NOTES
[facility] EMERGENCY DEP  EMERGENCY DEPARTMENT ENCOUNTER      Pt Name: Steve Gibbons  MRN: [id number]  Birthdate 2/18/1931  Date of evaluation: 12/24/2024  Provider: Antonieta Jalloh MD    CHIEF COMPLAINT       Chief Complaint   Patient presents with    abnormal labs          HISTORY OF PRESENT ILLNESS    Steve Gibbons is a 89 yo F with h/o HTN who has had leg swelling for a couple of weeks.  She was prescribed furosemide by her PCP for 5 days last week and was following up with her PCP today.  Labs were checked and her sodium was 118 and she was told to come to the ED.  Her sodium in early October was 131.  She was told to come to the ED for admission. Her son has noted increasing confusion for the past couple of weeks.            Additional history from independent historians:     Review of External Medical Records:     Nursing Notes were reviewed.    REVIEW OF SYSTEMS       Review of Systems    Except as noted above the remainder of the review of systems was reviewed and negative.       PAST MEDICAL HISTORY     Past Medical History:   Diagnosis Date    Hypertension          SURGICAL HISTORY     No past surgical history on file.      CURRENT MEDICATIONS       Previous Medications    No medications on file       ALLERGIES     Patient has no known allergies.    FAMILY HISTORY     No family history on file.       SOCIAL HISTORY       Social History     Socioeconomic History    Marital status:          PHYSICAL EXAM       ED Triage Vitals [12/24/24 1823]   BP Systolic BP Percentile Diastolic BP Percentile Temp Temp Source Pulse Respirations SpO2   (!) 146/64 -- -- 98.8 °F (37.1 °C) Oral 76 17 98 %      Height Weight - Scale         1.473 m (4' 10\") 44 kg (97 lb)             Body mass index is 20.27 kg/m².    Physical Exam  Vitals and nursing note reviewed.   Constitutional:       General: She is not in acute distress.  HENT:      Head: Normocephalic and atraumatic.      Mouth/Throat:      Mouth: Mucous membranes are  moist.   Eyes:      Extraocular Movements: Extraocular movements intact.      Conjunctiva/sclera: Conjunctivae normal.   Cardiovascular:      Rate and Rhythm: Normal rate.   Pulmonary:      Effort: Pulmonary effort is normal. No respiratory distress.   Abdominal:      General: There is no distension.   Musculoskeletal:         General: No deformity.      Cervical back: Normal range of motion.      Right lower leg: Edema present.      Left lower leg: Edema present.   Skin:     General: Skin is warm and dry.   Neurological:      General: No focal deficit present.      Mental Status: She is alert.         DIAGNOSTIC RESULTS     EKG: All EKG's are interpreted by the Emergency Department Physician listed in the interpretation in the absence of a cardiologist and may also be found below under Reassessment/ED Course.           RADIOLOGY:   Non-plain film images such as CT, Ultrasound and MRI are read by the radiologist. Plain radiographic images are visualized and preliminarily interpreted by the emergency physician with the below findings:    Interpretation per the Radiologist below, if available at the time of this note:    XR CHEST PORTABLE   Final Result      No acute findings.         Electronically signed by THERON TRIPP           LABS:  Labs Reviewed   CBC WITH AUTO DIFFERENTIAL - Abnormal; Notable for the following components:       Result Value    RBC 3.41 (*)     Hemoglobin 10.6 (*)     Hematocrit 31.1 (*)     Monocytes % 14 (*)     All other components within normal limits   COMPREHENSIVE METABOLIC PANEL - Abnormal; Notable for the following components:    Sodium 121 (*)     Chloride 88 (*)     Glucose 147 (*)     BUN 25 (*)     Creatinine 0.50 (*)     BUN/Creatinine Ratio 50 (*)     Calcium 8.4 (*)     AST 48 (*)     Albumin 2.9 (*)     Albumin/Globulin Ratio 0.8 (*)     All other components within normal limits   BRAIN NATRIURETIC PEPTIDE - Abnormal; Notable for the following components:    NT Pro-BNP 2,650  (*)     All other components within normal limits   COVID-19 & INFLUENZA COMBO   URINE CULTURE HOLD SAMPLE   EXTRA TUBES HOLD   URINALYSIS WITH MICROSCOPIC   SODIUM, URINE, RANDOM   OSMOLALITY, URINE       All other labs were within normal range or not returned as of this dictation.    EMERGENCY DEPARTMENT COURSE and DIFFERENTIAL DIAGNOSIS/MDM:   Vitals:    Vitals:    12/24/24 1823   BP: (!) 146/64   Pulse: 76   Resp: 17   Temp: 98.8 °F (37.1 °C)   TempSrc: Oral   SpO2: 98%   Weight: 44 kg (97 lb)   Height: 1.473 m (4' 10\")           Medical Decision Making  Amount and/or Complexity of Data Reviewed  Labs: ordered.    Risk  Decision regarding hospitalization.            REASSESSMENT          CRITICAL CARE TIME       CONSULTS:  None    PROCEDURES:  Unless otherwise noted below, none     Procedures        FINAL IMPRESSION      1. Hyponatremia    2. Bilateral lower extremity edema          DISPOSITION/PLAN   DISPOSITION      Perfect Serve Consult for Admission  7:34 PM    ED Room Number: ER16/16  Patient Name and age:  Steve Gibbons 89 y.o.  female  Working Diagnosis:   1. Hyponatremia    2. Bilateral lower extremity edema        COVID-19 Suspicion: No  Sepsis present:  No  Reassessment needed: No  Code Status:  Full Code  Readmission: No  Isolation Requirements: no  Recommended Level of Care: step down  Department: [facility] - [phone]  Consulting Provider: Aranza    Other:  h/o HTN.  Has had increasing confusion for past few weeks and leg swelling for couple of weeks.  PCP prescribed 5 days of furosemide last week.  Today checked labs and NA was 118 and was sent to the ED.  Her NA in October was 131.  In the ED tonight her sodium is  121.  K 4.5.  BUN 2650.   CXR normal.  UA pending.        PATIENT REFERRED TO:  No follow-up provider specified.    DISCHARGE MEDICATIONS:  New Prescriptions    No medications on file         (Please note that portions of this note were completed with a voice recognition program.

## 2024-12-25 NOTE — PROGRESS NOTES
Hospitalist Progress Note  Yane Frost MD  Answering service: [phone]        Date of Service:  2024  NAME:  Steve Gibbons  :  1931  MRN:  [id number]      Admission Summary:   Steve Gibbons is a 89 y.o. female with past medical history of hypertension presented to emergency department with reported low sodium, cough, swelling in both legs, and confusion.  Patient reportedly has been intermittently confused over the past 2 months.  She newly had cough over the past 2 weeks.  She reports recent swelling in both legs.  She had been seen by her PCP last week and was started on Lasix for duration of 5 days of treatment.  Symptoms notedly remain unresolved.  Today, she reported had labs showing sodium 118.  She was referred to the emergency department for further evaluation.  On arrival in the ED, initial reported vital signs were temperature 98.8 °F, /64, heart rate 76, respiratory rate 17, O2 saturation 98% on room air.  COVID- 19 and influenza test were negative.  Abnormal labs included sodium 121, chloride 88, BUN 25, creatinine is 0.50, blood glucose 147, calcium 8.4, albumin 2.9, AST 48, proBNP 2650, hemoglobin 10.6, hematocrit 31.1.  Patient is now seen for admission to the hospitalist service.  In addition, patient's son incidentally notes that patient has a mass on right side of neck which was enlarged about 1 month ago but now has decreased in size.  He notes the patient is followed as outpatient by [facility] home health service and was seen by nurse practitioner.        Interval history / Subjective:   Mental status much better, close to baseline   Pt complaining cough, with white sputum   Appetite improving  Denied abd pain, no nausea      Assessment & Plan:     Acute hyponatremia  -Na = 121  -possible due to the combination of increasing free water intake, while poor appetite, + diuresis, may have  filed at 12/25/2024 0759  Gross per 24 hour   Intake --   Output 1000 ml   Net -1000 ml        Physical Examination:     I had a face to face encounter with this patient and independently examined them on 12/25/2024 as outlined below:          General : alert x 2, awake, no acute distress,   HEENT: PEERL, EOMI, moist mucus membrane, TM clear  Neck: supple, no JVD, no meningeal signs  Chest: Clear to auscultation bilaterally   CVS: S1 S2 heard, Capillary refill less than 2 seconds  Abd: soft/ non tender, non distended, BS physiological,   Ext: no clubbing, no cyanosis, ++ edema, brisk 2+ DP pulses  Neuro/Psych: pleasant mood and affect, CN 2-12 grossly intact, sensory grossly within normal limit, Strength 5/5 in all extremities, DTR 1+ x 4  Skin: warm            Data Review:    Review and/or order of clinical lab test    I have independently reviewed and interpreted patient's lab and all other diagnostic data    Notes reviewed from all clinical/nonclinical/nursing services involved in patient's clinical care. Care coordination discussions were held with appropriate clinical/nonclinical/ nursing providers based on care coordination needs.     Labs:     Recent Labs     12/24/24 1835 12/25/24 0116   WBC 5.8 5.3   HGB 10.6* 10.0*   HCT 31.1* 29.2*    205     Recent Labs     12/24/24 1835 12/24/24  2313 12/25/24 0116 12/25/24  0126   * 127* 128*  --    K 4.5 3.6 3.4*  --    CL 88* 92* 95*  --    CO2 29 29 29  --    BUN 25* 20 19  --    MG  --   --   --  2.0   PHOS  --   --  3.2  --      Recent Labs     12/24/24 1835 12/25/24 0116   ALT 32 24   GLOB 3.7 3.2     Recent Labs     12/25/24 0116   INR 1.1   APTT 30.9      Recent Labs     12/24/24 1835   TIBC 353      No results found for: \"RBCF\"   No results for input(s): \"PH\", \"PCO2\", \"PO2\" in the last 72 hours.  No results for input(s): \"CPK\" in the last 72 hours.    Invalid input(s): \"CPKMB\", \"CKNDX\", \"TROIQ\"  Lab Results   Component Value Date/Time

## 2024-12-25 NOTE — PROGRESS NOTES
TRANSFER - IN REPORT:    Verbal report received from Hue on [facility] Edwige  being received from ED for routine progression of patient care      Report consisted of patient's Situation, Background, Assessment and   Recommendations(SBAR).     Information from the following report(s) ED SBAR was reviewed with the receiving nurse.    Opportunity for questions and clarification was provided.      Assessment completed upon patient's arrival to unit and care assumed.   Dual skin performed.  for admission assessment [facility] #[id number]. Son also present.

## 2024-12-25 NOTE — ED NOTES
ED TO INPATIENT SBAR HANDOFF    Patient Name: Steve Gibbons   :  1931  89 y.o.   MRN:  [id number]  ED Room #:  ER16/16     Situation  Code Status: Full Code   Allergies: Patient has no known allergies.  Weight: Patient Vitals for the past 96 hrs (Last 3 readings):   Weight   24 1823 44 kg (97 lb)       Arrived from: home    Chief Complaint:   Chief Complaint   Patient presents with    abnormal labs        Hospital Problem/Diagnosis:  Principal Problem:    Acute hyponatremia  Resolved Problems:    * No resolved hospital problems. *      Mobility: limited transfer mobility   ED Fall Risk: Presents to emergency department  because of falls (Syncope, seizure, or loss of consciousness): No, Age > 70: Yes, Altered Mental Status, Intoxication with alcohol or substance confusion (Disorientation, impaired judgment, poor safety awaremess, or inability to follow instructions): No, Impaired Mobility: Ambulates or transfers with assistive devices or assistance; Unable to ambulate or transer.: Yes, Nursing Judgement: Yes   Fell in ED or prior to admission: no   Restraints: no     Sitter: no   Family/Caregiver Present: yes    Neet to know social/safety information: Only speaks mandarin, son at bedside translates    Background  History:   Past Medical History:   Diagnosis Date    Hypertension        Assessment    Abnormal Assessment Findings: Bilateral LE edema, cough, son reports mass to right side of pts neck  Imaging:   Vascular duplex lower extremity venous bilateral         XR CHEST PORTABLE   Final Result      No acute findings.         Electronically signed by THERON TRIPP      CT HEAD WO CONTRAST    (Results Pending)   CT SOFT TISSUE NECK WO CONTRAST    (Results Pending)     Abnormal labs:   Abnormal Labs Reviewed   URINALYSIS WITH MICROSCOPIC - Abnormal; Notable for the following components:       Result Value    Blood, Urine SMALL (*)     All other components within normal limits   CBC WITH AUTO DIFFERENTIAL -  Abnormal; Notable for the following components:    RBC 3.41 (*)     Hemoglobin 10.6 (*)     Hematocrit 31.1 (*)     Monocytes % 14 (*)     All other components within normal limits   COMPREHENSIVE METABOLIC PANEL - Abnormal; Notable for the following components:    Sodium 121 (*)     Chloride 88 (*)     Glucose 147 (*)     BUN 25 (*)     Creatinine 0.50 (*)     BUN/Creatinine Ratio 50 (*)     Calcium 8.4 (*)     AST 48 (*)     Albumin 2.9 (*)     Albumin/Globulin Ratio 0.8 (*)     All other components within normal limits   BRAIN NATRIURETIC PEPTIDE - Abnormal; Notable for the following components:    NT Pro-BNP 2,650 (*)     All other components within normal limits   IRON AND TIBC - Abnormal; Notable for the following components:    Iron 192 (*)     Iron % Saturation 54 (*)     All other components within normal limits   VITAMIN B12 - Abnormal; Notable for the following components:    Vitamin B-12 1479 (*)     All other components within normal limits   FOLATE - Abnormal; Notable for the following components:    Folate 26.9 (*)     All other components within normal limits   BASIC METABOLIC PANEL - Abnormal; Notable for the following components:    Sodium 127 (*)     Chloride 92 (*)     Creatinine 0.40 (*)     BUN/Creatinine Ratio 50 (*)     Calcium 7.9 (*)     All other components within normal limits       Vitals/MEWS: MEWS Score: 1  Level of Consciousness: Alert (0)   Vitals:    12/24/24 2330 12/25/24 0000 12/25/24 0030 12/25/24 0100   BP: (!) 161/54 (!) 152/50 (!) 141/60 (!) 162/61   Pulse: 65 65 68 67   Resp: 10 11 12 11   Temp:       TempSrc:       SpO2: 100% 100% 100% 98%   Weight:       Height:         DI:   Predictive Model Details          35 (Caution)  Factor Value    Calculated 12/25/2024 01:24 40% Age 89 years old    Deterioration Index Model 33% Respiratory rate 11     13% Sodium abnormal (127 mmol/L)     10% Systolic 162     2% Hematocrit abnormal (31.1 %)     2% Potassium 3.6 mmol/L     0% Pulse

## 2024-12-25 NOTE — H&P
History and Physical    Date of Service:  12/24/2024  Primary Care Provider: Rosa Alvarado MD  Source of information: Patient is limited historian with history of dementia.  Patient is accompanied by her son who provides additional history and provides Chinese translation.  [facility] remote language line (Chinese) translation service was offered but patient's son notes that they preferred to have him provide translation.  Remainder of history was obtained per review of ED and electronic medical records.    Chief Complaint: Patient does not provide      History of Presenting Illness:   Steve Gibbons is a 89 y.o. female with past medical history of hypertension presented to emergency department with reported low sodium, cough, swelling in both legs, and confusion.  Patient reportedly has been intermittently confused over the past 2 months.  She newly had cough over the past 2 weeks.  She reports recent swelling in both legs.  She had been seen by her PCP last week and was started on Lasix for duration of 5 days of treatment.  Symptoms notedly remain unresolved.  Today, she reported had labs showing sodium 118.  She was referred to the emergency department for further evaluation.  On arrival in the ED, initial reported vital signs were temperature 98.8 °F, /64, heart rate 76, respiratory rate 17, O2 saturation 98% on room air.  COVID- 19 and influenza test were negative.  Abnormal labs included sodium 121, chloride 88, BUN 25, creatinine is 0.50, blood glucose 147, calcium 8.4, albumin 2.9, AST 48, proBNP 2650, hemoglobin 10.6, hematocrit 31.1.  Patient is now seen for admission to the hospitalist service.  In addition, patient's son incidentally notes that patient has a mass on right side of neck which was enlarged about 1 month ago but now has decreased in size.  He notes the patient is followed as outpatient by [facility] home health service and was seen by nurse practitioner.       REVIEW OF        [unfilled]    IMAGING:   XR CHEST PORTABLE   Final Result      No acute findings.         Electronically signed by THERON TRIPP      Vascular duplex lower extremity venous bilateral    (Results Pending)        ECG/ECHO:  [unfilled]       Notes reviewed from all clinical/nonclinical/nursing services involved in patient's clinical care. Care coordination discussions were held with appropriate clinical/nonclinical/ nursing providers based on care coordination needs.     Assessment:   Given the patient's current clinical presentation, there is a high level of concern for decompensation if discharged from the emergency department. Complex decision making was performed, which includes reviewing the patient's available past medical records, laboratory results, and imaging studies.    Principal Problem:    Acute hyponatremia  Resolved Problems:    * No resolved hospital problems. *      Plan:     Acute hyponatremia  -Likely secondary to recent diuretics  -Na = 121  -Repeat serum sodium q 4 hours and proceed towards slow correction  -Order urine sodium & osmolality  -Order serum osmolality  -Consult nephrologist  -Pending repeat Na result, no additional interventions for now    2.  Elevated brain natriuretic peptide  -suspect CHF unspecified  -proBNP = 2,650   -chest xray portable: no acute findings  -no diuretics (including Furosemide) not ordered for now due to hyponatremia  -order TTE in a.m.  -place on strict Is & Os  -order daily weights  -GDMT as tolerated    3.  Bilateral lower extremity edema       Chronic venous stasis   -order venous duplex BLE  -keep BLE elevated at rest    4.  AMS (altered mental status)       Confusion  -intermittent  -order CT head without IV contrast  -place on neuro checks and fall precautions  -ambulate with assistance    5.  Cough  -order Robitussin and Tessalon prn    6.  Normocytic normochromic anemia  -Hgb = 10.6, Hct = 31.1  -repeat H&H  -transfuse if Hgb < 7.0  -order  iron profile, B12, folate levels  -order fecal occult blood test    7.  LFT elevation  -mildly elevated AST = 48  -repeat LFTs in a.m.    8.  Dementia  -Aricept was prescribed per review of medication list provided by patient's son.  However patient reportedly is not currently taking Aricept.    9.  History of right neck mass  -reported by patient's son but no mass palpable on current exam  -will order CT soft tissue neck without IV contrast to further evaluate    10.  Generalized weakness  -ambulate with assistance only    DIET: ADULT DIET; Regular ; FLUID RESTRICTION ~ 1500 mL / day  ISOLATION PRECAUTIONS: No active isolations  CODE STATUS: Full Code   Central Line:   none  DVT PROPHYLAXIS: Lovenox  FUNCTIONAL STATUS PRIOR TO HOSPITALIZATION: Ambulatory and capable of self-care but relies on assistive devices (rolling walker/cane).   Ambulatory status/function: Ambulates with assistance:  Cane and Walker   EARLY MOBILITY ASSESSMENT: Recommend routine ambulation while hospitalized with the assistance of nursing staff and Recommend an assessment from physical therapy and/or occupational therapy  ANTICIPATED DISCHARGE: Greater than 48 hours.  ANTICIPATED DISPOSITION: Home with Home Healthcare  EMERGENCY CONTACT/SURROGATE DECISION MAKER:   Naldo Gibbons (Child)  [phone] (Mobile)     CRITICAL CARE WAS PERFORMED FOR THIS ENCOUNTER: NO.    ADVANCED DIRECTIVE/ CODE STATUS:  FULL CODE as per discussion with patient's son who is primary medical decision maker.  Patient has dementia and is not answering all questions appropriately.     Signed By: Devaughn Cobian MD     December 24, 2024         Please note that this dictation may have been completed with Dragon, the computer voice recognition software.  Quite often unanticipated grammatical, syntax, homophones, and other interpretive errors are inadvertently transcribed by the computer software.  Please disregard these errors.  Please excuse any errors that have escaped

## 2024-12-26 ENCOUNTER — APPOINTMENT (OUTPATIENT)
Facility: HOSPITAL | Age: 89
End: 2024-12-26
Payer: MEDICARE

## 2024-12-26 LAB
ALBUMIN SERPL-MCNC: 2.9 G/DL (ref 3.5–5)
ANION GAP SERPL CALC-SCNC: 8 MMOL/L (ref 2–12)
BUN SERPL-MCNC: 15 MG/DL (ref 6–20)
BUN/CREAT SERPL: 43 (ref 12–20)
CALCIUM SERPL-MCNC: 8.3 MG/DL (ref 8.5–10.1)
CHLORIDE SERPL-SCNC: 94 MMOL/L (ref 97–108)
CO2 SERPL-SCNC: 26 MMOL/L (ref 21–32)
COMMENT:: NORMAL
CREAT SERPL-MCNC: 0.35 MG/DL (ref 0.55–1.02)
ECHO BSA: 1.34 M2
GLUCOSE SERPL-MCNC: 123 MG/DL (ref 65–100)
PHOSPHATE SERPL-MCNC: 2.7 MG/DL (ref 2.6–4.7)
POTASSIUM SERPL-SCNC: 3.5 MMOL/L (ref 3.5–5.1)
SODIUM SERPL-SCNC: 128 MMOL/L (ref 136–145)
SPECIMEN HOLD: NORMAL

## 2024-12-26 PROCEDURE — 1100000000 HC RM PRIVATE

## 2024-12-26 PROCEDURE — 97165 OT EVAL LOW COMPLEX 30 MIN: CPT

## 2024-12-26 PROCEDURE — 6360000002 HC RX W HCPCS: Performed by: FAMILY MEDICINE

## 2024-12-26 PROCEDURE — 2500000003 HC RX 250 WO HCPCS: Performed by: FAMILY MEDICINE

## 2024-12-26 PROCEDURE — 6370000000 HC RX 637 (ALT 250 FOR IP): Performed by: INTERNAL MEDICINE

## 2024-12-26 PROCEDURE — 97530 THERAPEUTIC ACTIVITIES: CPT

## 2024-12-26 PROCEDURE — 6370000000 HC RX 637 (ALT 250 FOR IP): Performed by: HOSPITALIST

## 2024-12-26 PROCEDURE — 80069 RENAL FUNCTION PANEL: CPT

## 2024-12-26 PROCEDURE — 97116 GAIT TRAINING THERAPY: CPT

## 2024-12-26 PROCEDURE — 76700 US EXAM ABDOM COMPLETE: CPT

## 2024-12-26 PROCEDURE — 6370000000 HC RX 637 (ALT 250 FOR IP): Performed by: FAMILY MEDICINE

## 2024-12-26 PROCEDURE — 6360000002 HC RX W HCPCS: Performed by: HOSPITALIST

## 2024-12-26 PROCEDURE — 97161 PT EVAL LOW COMPLEX 20 MIN: CPT

## 2024-12-26 PROCEDURE — 97535 SELF CARE MNGMENT TRAINING: CPT

## 2024-12-26 RX ORDER — POTASSIUM CHLORIDE 750 MG/1
20 TABLET, EXTENDED RELEASE ORAL ONCE
Status: COMPLETED | OUTPATIENT
Start: 2024-12-26 | End: 2024-12-26

## 2024-12-26 RX ADMIN — SODIUM CHLORIDE, PRESERVATIVE FREE 10 ML: 5 INJECTION INTRAVENOUS at 10:30

## 2024-12-26 RX ADMIN — SODIUM CHLORIDE, PRESERVATIVE FREE 10 ML: 5 INJECTION INTRAVENOUS at 21:09

## 2024-12-26 RX ADMIN — AZITHROMYCIN DIHYDRATE 500 MG: 250 TABLET, FILM COATED ORAL at 10:21

## 2024-12-26 RX ADMIN — ENOXAPARIN SODIUM 30 MG: 100 INJECTION SUBCUTANEOUS at 10:21

## 2024-12-26 RX ADMIN — METOPROLOL TARTRATE 100 MG: 50 TABLET, FILM COATED ORAL at 21:09

## 2024-12-26 RX ADMIN — Medication 15 G: at 11:47

## 2024-12-26 RX ADMIN — BENZONATATE 200 MG: 100 CAPSULE ORAL at 21:09

## 2024-12-26 RX ADMIN — Medication 1000 UNITS: at 10:21

## 2024-12-26 RX ADMIN — HYDRALAZINE HYDROCHLORIDE 10 MG: 20 INJECTION INTRAMUSCULAR; INTRAVENOUS at 06:29

## 2024-12-26 RX ADMIN — POTASSIUM CHLORIDE 20 MEQ: 750 TABLET, EXTENDED RELEASE ORAL at 11:47

## 2024-12-26 RX ADMIN — BENZONATATE 200 MG: 100 CAPSULE ORAL at 16:04

## 2024-12-26 RX ADMIN — CALCIUM CARBONATE (ANTACID) CHEW TAB 500 MG 500 MG: 500 CHEW TAB at 10:21

## 2024-12-26 RX ADMIN — BENZONATATE 200 MG: 100 CAPSULE ORAL at 10:21

## 2024-12-26 NOTE — CARE COORDINATION
Care Management Initial Assessment       RUR: 15% Moderate   Readmission? No  1st IM letter given? Yes -   1st  letter given: No n/a       12/26/24 1142   Service Assessment   Patient Orientation Unable to Assess  (Pt not speaking, talked with son)   History Provided By Child/Family   Primary Caregiver Private caregiver   Accompanied By/Relationship elena Gibbons   Support Systems Children;Family Members;Home Care Staff   Patient's Healthcare Decision Maker is: Legal Next of Kin   PCP Verified by CM Yes  ([facility] MD Rosa Alvarado with monthly NP visits)   Last Visit to PCP Within last 3 months  (NP)   Prior Functional Level Assistance with the following:;Bathing;Dressing;Toileting;Cooking;Housework;Shopping;Mobility   Current Functional Level Assistance with the following:;Bathing;Dressing;Toileting;Cooking;Housework;Shopping;Mobility   Can patient return to prior living arrangement Yes   Ability to make needs known: Poor   Family able to assist with home care needs: Other (comment)  (family and private pay caregivers)   Would you like for me to discuss the discharge plan with any other family members/significant others, and if so, who? Yes  (Son Naldo Gibbons)   Financial Resources Medicare   Social/Functional History   Lives With Alone;Home care staff   Type of Home House   Home Layout Two level;Able to Live on Main level with bedroom/bathroom   Home Access Stairs to enter with rails  (through garage)   Entrance Stairs - Number of Steps 3   Entrance Stairs - Rails Both   Bathroom Shower/Tub Tub/Shower unit   Bathroom Toilet Standard   Bathroom Equipment Grab bars around toilet;Grab bars in shower   Home Equipment Cane;Cane - Quad;Rollator   Receives Help From Family;Personal care attendant   Discharge Planning   Type of Residence House   Patient expects to be discharged to: House     CM met with pt and son to introduce self, role, and to conduct initial assessment. Pt awake but eyes closed and  not engaged in conversation (requires translation services but has been unable to provide hx today with any discipline using translation services) Facesheet demographics verified by son.     Residence: pt resides alone (with paid caregivers ) in a two story home. Pt able to live on 1st floor.   ADL: Assistance with all, can dress and toilet but needs assistance to complete in timely manner, needs assistance with cleaning self after toileting, uses rollator for safe ambulation, caregivers help with showering/bathing  Caregivers: Son lives next door, caregivers in home daily 8:00 - 2:00 and son reports getting ready to re-hire overnight caregivers from 9:30 pm - 7:30 am. Not agency, privately obtained   DME: uses rollator, shower chair, shower and toilet grab bars   Ph[facility]acy: [facility] - updated in pt's chart   PCP: [facility] home based primary care - Rosa Alvarado - NP goes to pt's home monthly   Verified Insurance: Medicare Part A and Part B  Emergency Contacts: Claudio Gibbons [phone]  Previous rehab services: [facility]   Transportation:  Family     MAGDALENA Smalls    [facility]    or by [employer]

## 2024-12-26 NOTE — ACP (ADVANCE CARE PLANNING)
Advance Care Planning     Advance Care Planning (ACP) Physician/NP/PA Conversation    Date of Conversation: 12/24/2024  Conducted with: Patient with out Decision Making Capacity  Other persons present: Son, Naldo Gibbons    Healthcare Decision Maker:     Primary Decision Maker: Naldo Gibbons - Child - [phone]    Care Preferences:    Hospitalization:  \"If your health worsens and it becomes clear that your chance of recovery is unlikely, what would be your preference regarding hospitalization?\"  The patient would prefer hospitalization.    Ventilation:  \"If you were unable to breath on your own and your chance of recovery was unlikely, what would be your preference about the use of a ventilator (breathing machine) if it was available to you?\"  The patient would desire the use of a ventilator.    Resuscitation:  \"In the event your heart stopped as a result of an underlying serious health condition, would you want attempts made to restart your heart, or would you prefer a natural death?\"  Yes, attempt to resuscitate.    treatment goals, benefit/burden of treatment options, artificial nutrition, ventilation preferences, hospitalization preferences, and resuscitation preferences    Conversation Outcomes / Follow-Up Plan:  ACP complete - no further action today  Reviewed DNR/DNI and patient elects Full Code (Attempt Resuscitation)    Length of Voluntary ACP Conversation in minutes:  16 minutes    Vania Chisholm MD  12/26/2024

## 2024-12-26 NOTE — PROGRESS NOTES
Hospitalist Progress Note  Vania Chisholm MD  Answering service: [phone]        Date of Service:  2024  NAME:  Steve Gibbons  :  1931  MRN:  [id number]      Admission Summary:     \"Steve Gibbons is a 89 y.o. female with past medical history of hypertension presented to emergency department with reported low sodium, cough, swelling in both legs, and confusion.  Patient reportedly has been intermittently confused over the past 2 months.  She newly had cough over the past 2 weeks.  She reports recent swelling in both legs.  She had been seen by her PCP last week and was started on Lasix for duration of 5 days of treatment.  Symptoms notedly remain unresolved.  Today, she reported had labs showing sodium 118.  She was referred to the emergency department for further evaluation.  On arrival in the ED, initial reported vital signs were temperature 98.8 °F, /64, heart rate 76, respiratory rate 17, O2 saturation 98% on room air.  COVID- 19 and influenza test were negative.  Abnormal labs included sodium 121, chloride 88, BUN 25, creatinine is 0.50, blood glucose 147, calcium 8.4, albumin 2.9, AST 48, proBNP 2650, hemoglobin 10.6, hematocrit 31.1.  Patient is now seen for admission to the hospitalist service.  In addition, patient's son incidentally notes that patient has a mass on right side of neck which was enlarged about 1 month ago but now has decreased in size.  He notes the patient is followed as outpatient by [facility] home health service and was seen by nurse practitioner. \"    Interval history / Subjective:     Patient seen and examined at the bedside.  Patient is Chinese speaking.  She is conscious and alert but disoriented.  Her son was at the bedside, he stated that she looks better.  Patient denied any chest pain or difficulty of breathing.  I updated her son at the clinical finding, care plan and  only     Code status: full   Prophylaxis: sc lovenox   Care Plan discussed with: pt, son, rn, renal   Anticipated Disposition: home with HH in 1-2 days   Central Line:            Vital Signs:    Last 24hrs VS reviewed since prior progress note. Most recent are:  Vitals:    12/26/24 0730   BP: (!) 179/64   Pulse:    Resp:    Temp:    SpO2:          Intake/Output Summary (Last 24 hours) at 12/26/2024 0855  Last data filed at 12/26/2024 0534  Gross per 24 hour   Intake 850 ml   Output 500 ml   Net 350 ml        Physical Examination:     I had a face to face encounter with this patient and independently examined them on 12/26/2024 as outlined below:          General : Conscious and alert, oriented to self, no acute distress,   HEENT: PEERL, EOMI, moist mucus membrane, TM clear  Neck: supple, no JVD, no meningeal signs  Chest: Clear to auscultation bilaterally   CVS: S1 S2 heard, Capillary refill less than 2 seconds  Abd: soft/ non tender, non distended, BS physiological,   Ext: no clubbing, no cyanosis, ++ edema, brisk 2+ DP pulses  Neuro/Psych: pleasant mood and affect, CN 2-12 grossly intact, sensory grossly within normal limit, Strength 5/5 in all extremities, DTR 1+ x 4  Skin: warm            Data Review:    Review and/or order of clinical lab test    I have independently reviewed and interpreted patient's lab and all other diagnostic data    Notes reviewed from all clinical/nonclinical/nursing services involved in patient's clinical care. Care coordination discussions were held with appropriate clinical/nonclinical/ nursing providers based on care coordination needs.     Labs:     Recent Labs     12/24/24  1835 12/25/24  0116   WBC 5.8 5.3   HGB 10.6* 10.0*   HCT 31.1* 29.2*    205     Recent Labs     12/24/24  2313 12/25/24  0116 12/25/24  0126 12/26/24  0718   * 128*  --  128*   K 3.6 3.4*  --  3.5   CL 92* 95*  --  94*   CO2 29 29  --  26   BUN 20 19  --  15   MG  --   --  2.0  --    PHOS  --  3.2

## 2024-12-26 NOTE — PROGRESS NOTES
[facility]  Leatha Rubi MD  [phone]            Renal Progress Note    NAME:  Stvee Gibbons   :   1931   MRN:   [id number]     Date/Time:  2024 10:59 AM            DISCUSSION / PLAN :      Assessment:    Hyponatremia probably due to diuretics and high water intake.  Urine osmolality is low which is consistent with using diuretics also can be due to high water intake.  Unable to get any history from the patient even with   -She is also on losartan which can cause hyponatremia.  -Hyponatremia improving with holding Lasix and fluid restriction-na 128    Edema, low albumin-UA neg for protein-UPCR not sent yet    Hypokalemia-resolved  Contraction alkalosis-resolved  HTN-not optimally controlled    Malnutrition?        Plan:  -Add Urena 15 g daily  -Continue with current management  -Follow-up serum sodium  -Replete potassium prn  -Fluid restriction 1200 cc  -Check TSH and lipid panel  -check UPCR-not sent yet  Blood pressure management  Recommend dietary consult           ___________________________________________________  Subjective:         Unable to communicate with patient even with .  Spoke to son yesterday on phone.  She used to drink plenty of water.    Medications reviewed:  Current Facility-Administered Medications   Medication Dose Route Frequency    urea (URE-NA) packet 15 g  15 g Oral Daily    losartan (COZAAR) tablet 100 mg  100 mg Oral Daily    ferrous sulfate (IRON 325) tablet 325 mg  325 mg Oral Daily with breakfast    metoprolol tartrate (LOPRESSOR) tablet 100 mg  100 mg Oral BID    calcium carbonate (TUMS) chewable tablet 500 mg  500 mg Oral Daily    Vitamin D (CHOLECALCIFEROL) tablet 1,000 Units  1,000 Units Oral Daily    pantoprazole (PROTONIX) tablet 40 mg  40 mg Oral QAM AC    benzonatate (TESSALON) capsule 200 mg  200 mg Oral TID    hydrALAZINE (APRESOLINE) injection 10 mg  10 mg IntraVENous Q6H PRN    azithromycin (ZITHROMAX) tablet 500 mg  500 mg

## 2024-12-26 NOTE — PLAN OF CARE
times/week    Recommendation for discharge: (in order for the patient to meet his/her long term goals): 24/7 assistance OR  Moderate intensity short-term skilled occupational therapy up to 5x/week    Other factors to consider for discharge: lives alone, patient's current support system is unable to meet their requirements for physical assistance, poor safety awareness, impaired cognition, high risk for falls, and not safe to be alone    IF patient discharges home will need the following DME: patient owns DME required for discharge       SUBJECTIVE:   Patient stated, “Tired.” Pt reporting she is tired after toileting and prefers to return to bed  OBJECTIVE DATA SUMMARY:     Past Medical History:   Diagnosis Date    Hypertension    No past surgical history on file.       Expanded or extensive additional review of patient history:   Social/Functional History  Lives With: Alone, Home care staff  Type of Home: House  Home Layout: Two level, Able to Live on Main level with bedroom/bathroom  Home Access: Stairs to enter with rails (through garage)  Entrance Stairs - Number of Steps: 3  Entrance Stairs - Rails: Both  Bathroom Shower/Tub: Tub/Shower unit  Bathroom Toilet: Standard  Bathroom Equipment: Grab bars around toilet, Grab bars in shower  Home Equipment: Cane, Cane - Quad, Rollator  Has the patient had two or more falls in the past year or any fall with injury in the past year?: No  Receives Help From: Family, Personal care attendant  Prior Level of Assist for ADLs: Needs assistance  Bath: Stand by assistance  Dressing: Minimal assistance  Grooming: Stand by assistance  Feeding: Modified independent   Toileting: Needs assistance  Prior Level of Assist for Homemaking: Needs assistance  Homemaking Responsibilities: Yes  Prior Level of Assist for Ambulation: Independent household ambulator, with or without device  Prior Level of Assist for Transfers: Independent  Active : No  Additional Comments: Pt is a poor  support;Poor      ADL Assessment:     Feeding: Setup;Based on clinical judgement     Grooming: Minimal assistance;Based on clinical judgement  Grooming Skilled Clinical Factors: Pt using L hand to complete tasks even though Pt is R handed.    UE Bathing: Moderate assistance;Based on clinical judgement     Product Used : Bath wipes;Chlorhexidine wipes;Incontinent cleanser    LE Bathing: Maximum assistance;Based on clinical judgement     UE Dressing: Minimal assistance;Based on clinical judgement     LE Dressing: Dependent/Total;Based on clinical judgement     Toileting: Dependent/Total  Toileting Skilled Clinical Factors: Pt toileting using BSC with assistance of two helpers. Pt requiring assisance for clothing management and hygiene.    Functional Mobility: Moderate assistance;Minimal assistance  Functional Mobility Skilled Clinical Factors: RW, Ax 1-2     ADL Intervention and task modifications:      [facility] AM-PAC \"6 Clicks\"                                                       Daily Activity Inpatient Short Form  How much help from another person does the patient currently need... Total; A Lot A Little None   1.  Putting on and taking off regular lower body clothing? [x]  1 []  2 []  3 []  4   2.  Bathing (including washing, rinsing, drying)? []  1 [x]  2 []  3 []  4   3.  Toileting, which includes using toilet, bedpan or urinal? [x] 1 []  2 []  3 []  4   4.  Putting on and taking off regular upper body clothing? []  1 []  2 [x]  3 []  4   5.  Taking care of personal grooming such as brushing teeth? []  1 []  2 [x]  3 []  4   6.  Eating meals? []  1 []  2 [x]  3 []  4   © 2007, Trustees of [facility], under license to [employer]. All rights reserved     Score: 13/24     Interpretation of Tool:  Represents clinically-significant functional categories (i.e. Activities of daily living).    Cutoff score 39.4 (19) correlates to a good likelihood of discharging home versus a facility  Kayleigh DUNCAN

## 2024-12-26 NOTE — PLAN OF CARE
Problem: Physical Therapy - Adult  Goal: By Discharge: Performs mobility at highest level of function for planned discharge setting.  See evaluation for individualized goals.  Description: FUNCTIONAL STATUS PRIOR TO ADMISSION: patient ambulatory with  RW or quad cane.  Had caregiver M-F 8-2 and used to have night caregiver 9-7 but recently quit. Care giver assisted with bathing, mobility, meals.    HOME SUPPORT PRIOR TO ADMISSION: The patient lived alone with caregivers and son to provide assistance.    Physical Therapy Goals  Initiated 12/26/2024  1.  Patient will move from supine to sit and sit to supine and roll side to side in bed with supervision/set-up within 7 day(s).    2.  Patient will perform sit to stand with supervision/set-up within 7 day(s).  3.  Patient will transfer from bed to chair and chair to bed with supervision/set-up using the least restrictive device within 7 day(s).  4.  Patient will ambulate with contact guard assist for 50 feet with the least restrictive device within 7 day(s).   5.  Patient will ascend/descend 4 stairs with  handrail(s) with minimal assistance within 7 day(s).    Outcome: Progressing   PHYSICAL THERAPY EVALUATION    Patient: Steve Gibbons (89 y.o. female)  Date: 12/26/2024  Primary Diagnosis: Shortness of breath [R06.02]  Acute hyponatremia [E87.1]  Hyponatremia [E87.1]  Bilateral lower extremity edema [R60.0]       Precautions:                        ASSESSMENT :   DEFICITS/IMPAIRMENTS:   The patient is limited by decreased functional mobility, strength, activity tolerance, endurance, safety awareness, cognition, command following, balance following admission for abnormal lab values and now with increased confusion and decline in overall mobility.  Son  present to interpret as prior attempt with  system was unsuccessful.  Patient requiring cues to keep eyes open, to assist with balance and gait.     Based on the impairments listed above she is below her

## 2024-12-26 NOTE — PROGRESS NOTES
Occupational Therapy  12/26/24    Orders received, chart reviewed and patient evaluated by occupational therapy. Pending progression with skilled acute occupational therapy, recommend:    24/7 physical assistance, if unable- Moderate intensity short-term skilled occupational therapy up to 5x/week    Recommend with nursing patient to complete as able in order to maintain strength, endurance and independence: OOB to chair 3x/day, ADLs with assist and performing toileting with BSC and Min-ModA x2 using RW assist. Thank you for your assistance.     Full evaluation to follow.   [place] Buck OT

## 2024-12-26 NOTE — PROGRESS NOTES
1000: PT makes appropriate responses with Son. PT prefers not to use virtual .     1559: PerfectServed attending physician, MD Kathrine with the following message: PT's son is requesting to talk with you. He has a couple more questions. Thank you!  Response:    1602: Attempted to obtain an orthostatic BP (Q8h order) on PT. PT unwilling to complete at this time.     1449: PT's resting comfortably. Family at bedside.

## 2024-12-27 LAB
ALBUMIN SERPL-MCNC: 2.8 G/DL (ref 3.5–5)
ALBUMIN SERPL-MCNC: 3 G/DL (ref 3.5–5)
ALBUMIN/GLOB SERPL: 0.9 (ref 1.1–2.2)
ALP SERPL-CCNC: 78 U/L (ref 45–117)
ALT SERPL-CCNC: 24 U/L (ref 12–78)
ANION GAP SERPL CALC-SCNC: 5 MMOL/L (ref 2–12)
ANION GAP SERPL CALC-SCNC: 7 MMOL/L (ref 2–12)
AST SERPL-CCNC: 28 U/L (ref 15–37)
BILIRUB SERPL-MCNC: 0.4 MG/DL (ref 0.2–1)
BUN SERPL-MCNC: 18 MG/DL (ref 6–20)
BUN SERPL-MCNC: 20 MG/DL (ref 6–20)
BUN/CREAT SERPL: 38 (ref 12–20)
BUN/CREAT SERPL: 47 (ref 12–20)
CALCIUM SERPL-MCNC: 8.5 MG/DL (ref 8.5–10.1)
CALCIUM SERPL-MCNC: 8.7 MG/DL (ref 8.5–10.1)
CHLORIDE SERPL-SCNC: 96 MMOL/L (ref 97–108)
CHLORIDE SERPL-SCNC: 97 MMOL/L (ref 97–108)
CO2 SERPL-SCNC: 28 MMOL/L (ref 21–32)
CO2 SERPL-SCNC: 29 MMOL/L (ref 21–32)
CREAT SERPL-MCNC: 0.43 MG/DL (ref 0.55–1.02)
CREAT SERPL-MCNC: 0.47 MG/DL (ref 0.55–1.02)
GLOBULIN SER CALC-MCNC: 3.2 G/DL (ref 2–4)
GLUCOSE SERPL-MCNC: 119 MG/DL (ref 65–100)
GLUCOSE SERPL-MCNC: 132 MG/DL (ref 65–100)
PHOSPHATE SERPL-MCNC: 3.2 MG/DL (ref 2.6–4.7)
POTASSIUM SERPL-SCNC: 3.9 MMOL/L (ref 3.5–5.1)
POTASSIUM SERPL-SCNC: 4 MMOL/L (ref 3.5–5.1)
PROT SERPL-MCNC: 6 G/DL (ref 6.4–8.2)
SODIUM SERPL-SCNC: 131 MMOL/L (ref 136–145)
SODIUM SERPL-SCNC: 131 MMOL/L (ref 136–145)

## 2024-12-27 PROCEDURE — 6360000002 HC RX W HCPCS: Performed by: FAMILY MEDICINE

## 2024-12-27 PROCEDURE — 97535 SELF CARE MNGMENT TRAINING: CPT

## 2024-12-27 PROCEDURE — 6370000000 HC RX 637 (ALT 250 FOR IP): Performed by: HOSPITALIST

## 2024-12-27 PROCEDURE — 80053 COMPREHEN METABOLIC PANEL: CPT

## 2024-12-27 PROCEDURE — 6370000000 HC RX 637 (ALT 250 FOR IP): Performed by: INTERNAL MEDICINE

## 2024-12-27 PROCEDURE — 6370000000 HC RX 637 (ALT 250 FOR IP): Performed by: FAMILY MEDICINE

## 2024-12-27 PROCEDURE — 97530 THERAPEUTIC ACTIVITIES: CPT

## 2024-12-27 PROCEDURE — 2500000003 HC RX 250 WO HCPCS: Performed by: FAMILY MEDICINE

## 2024-12-27 PROCEDURE — 97116 GAIT TRAINING THERAPY: CPT

## 2024-12-27 PROCEDURE — 80069 RENAL FUNCTION PANEL: CPT

## 2024-12-27 PROCEDURE — 1100000000 HC RM PRIVATE

## 2024-12-27 RX ADMIN — METOPROLOL TARTRATE 100 MG: 50 TABLET, FILM COATED ORAL at 10:00

## 2024-12-27 RX ADMIN — BENZONATATE 200 MG: 100 CAPSULE ORAL at 10:00

## 2024-12-27 RX ADMIN — METOPROLOL TARTRATE 100 MG: 50 TABLET, FILM COATED ORAL at 21:42

## 2024-12-27 RX ADMIN — Medication 1000 UNITS: at 10:00

## 2024-12-27 RX ADMIN — FERROUS SULFATE TAB 325 MG (65 MG ELEMENTAL FE) 325 MG: 325 (65 FE) TAB at 10:00

## 2024-12-27 RX ADMIN — LOSARTAN POTASSIUM 100 MG: 50 TABLET, FILM COATED ORAL at 10:00

## 2024-12-27 RX ADMIN — BENZONATATE 200 MG: 100 CAPSULE ORAL at 15:56

## 2024-12-27 RX ADMIN — PANTOPRAZOLE SODIUM 40 MG: 40 TABLET, DELAYED RELEASE ORAL at 10:00

## 2024-12-27 RX ADMIN — CALCIUM CARBONATE (ANTACID) CHEW TAB 500 MG 500 MG: 500 CHEW TAB at 10:00

## 2024-12-27 RX ADMIN — SODIUM CHLORIDE, PRESERVATIVE FREE 10 ML: 5 INJECTION INTRAVENOUS at 10:01

## 2024-12-27 RX ADMIN — Medication 15 G: at 10:01

## 2024-12-27 RX ADMIN — SODIUM CHLORIDE, PRESERVATIVE FREE 10 ML: 5 INJECTION INTRAVENOUS at 21:43

## 2024-12-27 RX ADMIN — BENZONATATE 200 MG: 100 CAPSULE ORAL at 21:43

## 2024-12-27 RX ADMIN — AZITHROMYCIN DIHYDRATE 500 MG: 250 TABLET, FILM COATED ORAL at 10:00

## 2024-12-27 RX ADMIN — ENOXAPARIN SODIUM 30 MG: 100 INJECTION SUBCUTANEOUS at 10:01

## 2024-12-27 ASSESSMENT — PAIN SCALES - GENERAL: PAINLEVEL_OUTOF10: 0

## 2024-12-27 NOTE — CARE COORDINATION
CM met with patient and patient's son, Naldo. Son stated he lives beside the patient and when her paid caregivers are not there patient is alone. CM verified patient has paid care givers 8-2 and that son will hire caregivers through the night as well. CM communicated patient needing more assistance in the home, son stated he understood. FOC provided; patient previously open to [facility] and they would like to use them again for HHPT/OT. Referral sent, pending acceptance.    Medicare pt has received, reviewed, and signed 2nd IM letter informing them of their right to appeal the discharge.  Signed copy has been placed on pt bedside chart.    11:42 AM  Patient accepted by [facility] for HHPT/OT. Placed on AVS.     SHAHEED Bran   Care Manager  Available via [employer]        .

## 2024-12-27 NOTE — PLAN OF CARE
Problem: Occupational Therapy - Adult  Goal: By Discharge: Performs self-care activities at highest level of function for planned discharge setting.  See evaluation for individualized goals.  Description: FUNCTIONAL STATUS PRIOR TO ADMISSION:  Pt lives alone with hired caregivers 8-2:30 and 9:30-7:30. Although just recently the PM caregiver quit. Son lives a couple doors down from Pt. When Pt is alone is able to walk using RW and toilet herself independently.   Receives Help From: Family, Personal care attendant, Prior Level of Assist for ADLs: Needs assistance, Bath: Stand by assistance, Dressing: Minimal assistance, Grooming: Stand by assistance, Feeding: Modified independent , Toileting: Needs assistance, Prior Level of Assist for Homemaking: Needs assistance,  , Prior Level of Assist for Transfers: Independent, Active : No     HOME SUPPORT: Patient lived alone with hired assistance for a portion of the day.    Occupational Therapy Goals:  Initiated 12/26/2024  1.  Patient will perform upper body dressing with Stand by Assist within 7 day(s).  2.  Patient will perform lower body dressing with Moderate Assist within 7 day(s).  3.  Patient will perform grooming standing at sink with Minimal Assist within 7 day(s).  4.  Patient will perform toilet transfers with Minimal Assist  within 7 day(s).  5.  Patient will perform all aspects of toileting with Moderate Assist within 7 day(s).  6.  Patient will participate in upper extremity therapeutic exercise/activities with Minimal Assist for 8 minutes within 7 day(s).    7.  Patient will utilize energy conservation techniques during functional activities with visual cues within 7 day(s).    Outcome: Progressing     OCCUPATIONAL THERAPY TREATMENT  Patient: Steve Gibbons (89 y.o. female)  Date: 12/27/2024  Primary Diagnosis: Shortness of breath [R06.02]  Acute hyponatremia [E87.1]  Hyponatremia [E87.1]  Bilateral lower extremity edema [R60.0]       Precautions: Fall  today with no success); per son and observation from this threapist, patient with delayed processing and decreased comprehension of commands    Functional Mobility and Transfers for ADLs:  Bed Mobility:  Bed Mobility Training  Bed Mobility Training: No  Supine to Sit: Stand-by assistance     Transfers:   Transfer Training  Transfer Training: Yes  Overall Level of Assistance: Moderate assistance;Minimum assistance;Assist X1;Adaptive equipment;Additional time (attempted with quad cane, patient very unsteady and unable to maintain balance, utilized RW for further activity)  Interventions: Demonstration;Manual cues;Safety awareness training;Tactile cues;Visual cues  Sit to Stand: Moderate assistance;Minimum assistance;Assist X1;Adaptive equipment  Stand to Sit: Minimum assistance;Moderate assistance;Assist X1;Adaptive equipment  Bed to Chair: Minimum assistance;Assist X1  Toilet Transfer: Moderate assistance;Assist X1;Adaptive equipment;Additional time           Balance:     Balance  Sitting: Impaired  Sitting - Static: Good (unsupported)  Sitting - Dynamic: Fair (occasional)  Standing: Impaired;With support (RW)  Standing - Static: Fair;Constant support  Standing - Dynamic: Fair;Poor;Constant support      ADL Intervention:                   Grooming: Minimal assistance   Grooming Skilled Clinical Factors: standing at the sink to wash hands post toileting, constant A for balance with max visual cues for sequencing, fair carryover                            LE Dressing: Maximum assistance;Dependent/Total  LE Dressing Skilled Clinical Factors: Total A to doff socks and don slip on shoes seated in chair; Max A for brief adjustment with toileting    Toileting: Maximum assistance  Toileting Skilled Clinical Factors: toileting in bathroom, A for balance, brief management, and hygiene thoroughness with multimodal cues and increased time           Functional Mobility: Moderate assistance;Adaptive equipment  Functional Mobility

## 2024-12-27 NOTE — PROGRESS NOTES
Hospitalist Progress Note  Vania Chisholm MD  Answering service: [phone]        Date of Service:  2024  NAME:  Steve Gibbons  :  1931  MRN:  [id number]      Admission Summary:     \"Steve Gibbons is a 89 y.o. female with past medical history of hypertension presented to emergency department with reported low sodium, cough, swelling in both legs, and confusion.  Patient reportedly has been intermittently confused over the past 2 months.  She newly had cough over the past 2 weeks.  She reports recent swelling in both legs.  She had been seen by her PCP last week and was started on Lasix for duration of 5 days of treatment.  Symptoms notedly remain unresolved.  Today, she reported had labs showing sodium 118.  She was referred to the emergency department for further evaluation.  On arrival in the ED, initial reported vital signs were temperature 98.8 °F, /64, heart rate 76, respiratory rate 17, O2 saturation 98% on room air.  COVID- 19 and influenza test were negative.  Abnormal labs included sodium 121, chloride 88, BUN 25, creatinine is 0.50, blood glucose 147, calcium 8.4, albumin 2.9, AST 48, proBNP 2650, hemoglobin 10.6, hematocrit 31.1.  Patient is now seen for admission to the hospitalist service.  In addition, patient's son incidentally notes that patient has a mass on right side of neck which was enlarged about 1 month ago but now has decreased in size.  He notes the patient is followed as outpatient by [facility] home health service and was seen by nurse practitioner. \"    Interval history / Subjective:     Patient seen and examined at the bedside.  Patient is Chinese speaking.  She is conscious and alert but disoriented.    Her son was at the bedside, I updated the clinical finding and answered his all questions.      Assessment & Plan:     Acute hyponatremia  -possible due to the combination of  reviewed since prior progress note. Most recent are:  Vitals:    12/27/24 1000   BP:    Pulse: 97   Resp:    Temp:    SpO2:          Intake/Output Summary (Last 24 hours) at 12/27/2024 1043  Last data filed at 12/26/2024 2100  Gross per 24 hour   Intake --   Output 750 ml   Net -750 ml        Physical Examination:     I had a face to face encounter with this patient and independently examined them on 12/27/2024 as outlined below:          General : Conscious and alert, oriented to self, no acute distress,   HEENT: PEERL, EOMI, moist mucus membrane, TM clear  Neck: supple, no JVD, no meningeal signs  Chest: Clear to auscultation bilaterally   CVS: S1 S2 heard, Capillary refill less than 2 seconds  Abd: soft/ non tender, non distended, BS physiological,   Ext: no clubbing, no cyanosis, ++ edema, brisk 2+ DP pulses  Neuro/Psych: pleasant mood and affect, CN 2-12 grossly intact, sensory grossly within normal limit, Strength 5/5 in all extremities, DTR 1+ x 4  Skin: warm            Data Review:    Review and/or order of clinical lab test    I have independently reviewed and interpreted patient's lab and all other diagnostic data    Notes reviewed from all clinical/nonclinical/nursing services involved in patient's clinical care. Care coordination discussions were held with appropriate clinical/nonclinical/ nursing providers based on care coordination needs.     Labs:     Recent Labs     12/24/24 1835 12/25/24  0116   WBC 5.8 5.3   HGB 10.6* 10.0*   HCT 31.1* 29.2*    205     Recent Labs     12/25/24  0116 12/25/24  0126 12/26/24  0718 12/27/24  0544 12/27/24  0843   *  --  128* 131* 131*   K 3.4*  --  3.5 4.0 3.9   CL 95*  --  94* 96* 97   CO2 29  --  26 28 29   BUN 19  --  15 20 18   MG  --  2.0  --   --   --    PHOS 3.2  --  2.7 3.2  --      Recent Labs     12/24/24  1835 12/25/24  0116 12/27/24  0843   ALT 32 24 24   GLOB 3.7 3.2 3.2     Recent Labs     12/25/24  0116   INR 1.1   APTT 30.9      Recent  Labs     12/24/24  1835   TIBC 353      No results found for: \"RBCF\"   No results for input(s): \"PH\", \"PCO2\", \"PO2\" in the last 72 hours.  No results for input(s): \"CPK\" in the last 72 hours.    Invalid input(s): \"CPKMB\", \"CKNDX\", \"TROIQ\"  Lab Results   Component Value Date/Time    CHOL 114 12/25/2024 01:26 AM    HDL 79 12/25/2024 01:26 AM    LDL 30.6 12/25/2024 01:26 AM     No results found for: \"GLUCPOC\"  [unfilled]      Medications Reviewed:     Current Facility-Administered Medications   Medication Dose Route Frequency    urea (URE-NA) packet 15 g  15 g Oral Daily    losartan (COZAAR) tablet 100 mg  100 mg Oral Daily    ferrous sulfate (IRON 325) tablet 325 mg  325 mg Oral Daily with breakfast    metoprolol tartrate (LOPRESSOR) tablet 100 mg  100 mg Oral BID    calcium carbonate (TUMS) chewable tablet 500 mg  500 mg Oral Daily    Vitamin D (CHOLECALCIFEROL) tablet 1,000 Units  1,000 Units Oral Daily    pantoprazole (PROTONIX) tablet 40 mg  40 mg Oral QAM AC    benzonatate (TESSALON) capsule 200 mg  200 mg Oral TID    hydrALAZINE (APRESOLINE) injection 10 mg  10 mg IntraVENous Q6H PRN    azithromycin (ZITHROMAX) tablet 500 mg  500 mg Oral Daily    bisacodyl (DULCOLAX) suppository 10 mg  10 mg Rectal Daily PRN    sodium chloride flush 0.9 % injection 5-40 mL  5-40 mL IntraVENous 2 times per day    sodium chloride flush 0.9 % injection 5-40 mL  5-40 mL IntraVENous PRN    0.9 % sodium chloride infusion   IntraVENous PRN    enoxaparin Sodium (LOVENOX) injection 30 mg  30 mg SubCUTAneous Daily    polyethylene glycol (GLYCOLAX) packet 17 g  17 g Oral Daily PRN     ______________________________________________________________________  EXPECTED LENGTH OF STAY: 4  ACTUAL LENGTH OF STAY:          3                 Vania Chisholm MD

## 2024-12-27 NOTE — PLAN OF CARE
Problem: Physical Therapy - Adult  Goal: By Discharge: Performs mobility at highest level of function for planned discharge setting.  See evaluation for individualized goals.  Description: FUNCTIONAL STATUS PRIOR TO ADMISSION: patient ambulatory with  RW or quad cane.  Had caregiver M-F 8-2 and used to have night caregiver 9-7 but recently quit. Care giver assisted with bathing, mobility, meals.    HOME SUPPORT PRIOR TO ADMISSION: The patient lived alone with caregivers and son to provide assistance.    Physical Therapy Goals  Initiated 12/26/2024  1.  Patient will move from supine to sit and sit to supine and roll side to side in bed with supervision/set-up within 7 day(s).    2.  Patient will perform sit to stand with supervision/set-up within 7 day(s).  3.  Patient will transfer from bed to chair and chair to bed with supervision/set-up using the least restrictive device within 7 day(s).  4.  Patient will ambulate with contact guard assist for 50 feet with the least restrictive device within 7 day(s).   5.  Patient will ascend/descend 4 stairs with  handrail(s) with minimal assistance within 7 day(s).    Outcome: Progressing   PHYSICAL THERAPY TREATMENT    Patient: Steve Gibbons (89 y.o. female)  Date: 12/27/2024  Diagnosis: Shortness of breath [R06.02]  Acute hyponatremia [E87.1]  Hyponatremia [E87.1]  Bilateral lower extremity edema [R60.0] Acute hyponatremia      Precautions: Fall Risk                      ASSESSMENT:  Patient continues to benefit from skilled PT services and is progressing towards goals. Patient received in bed and son present to assist with language as does not do  with hospital language system.  Patient is moving better today and more alert, talking throughout session.  Able to walk to BR with assist but overall dependent for hollis care and incontinent of stool.   Gait is very unsteady with RW and requiring constant assist with mobility, balance and safety.  Increased gait to day in

## 2024-12-27 NOTE — PROGRESS NOTES
[facility]  Servando Mejia MD  [phone]            Renal Progress Note    NAME:  Steve Gibbons   :   1931   MRN:   [id number]     Date/Time:  2024 12:26 PM            DISCUSSION / PLAN :      Assessment:    Hyponatremia probably due to diuretics and high water intake.  Urine osmolality is low which is consistent with using diuretics also can be due to high water intake.  Unable to get any history from the patient even with   -She is also on losartan which can cause hyponatremia.  -Hyponatremia improving with holding Lasix and fluid restriction-Na 131    Edema, low albumin-UA neg for protein-UPCR not sent yet. Waiting for result    Hypokalemia-resolved  Contraction alkalosis-resolved  HTN-not optimally controlled    Malnutrition?        Plan:  -Continue Urena 15 g daily  -Continue with current management  -Follow-up serum sodium  -Replete potassium prn  -Fluid restriction 1200 cc  -Check TSH and lipid panel  -check UPCR-not sent yet, pending  Blood pressure management  Recommend dietary consult           ___________________________________________________  Subjective:         Unable to communicate with patient even with .  Spoke to son yesterday on phone.  She used to drink plenty of water.    24 Sitting up in chair. Did not acknowledge my presence. Does not appear to be in any distress.    Medications reviewed:  Current Facility-Administered Medications   Medication Dose Route Frequency    urea (URE-NA) packet 15 g  15 g Oral Daily    losartan (COZAAR) tablet 100 mg  100 mg Oral Daily    ferrous sulfate (IRON 325) tablet 325 mg  325 mg Oral Daily with breakfast    metoprolol tartrate (LOPRESSOR) tablet 100 mg  100 mg Oral BID    calcium carbonate (TUMS) chewable tablet 500 mg  500 mg Oral Daily    Vitamin D (CHOLECALCIFEROL) tablet 1,000 Units  1,000 Units Oral Daily    pantoprazole (PROTONIX) tablet 40 mg  40 mg Oral QAM AC    benzonatate (TESSALON) capsule 200

## 2024-12-27 NOTE — PLAN OF CARE
Problem: Safety - Adult  Goal: Free from fall injury  12/27/2024 1327 by Nimco Collins RN  Outcome: Progressing  12/27/2024 0116 by Felisa Nguyen RN  Outcome: Progressing     Problem: Discharge Planning  Goal: Discharge to home or other facility with appropriate resources  12/27/2024 1327 by Nimco Collins RN  Outcome: Progressing  12/27/2024 0116 by Felisa Nguyen RN  Outcome: Progressing     Problem: Skin/Tissue Integrity  Goal: Absence of new skin breakdown  Description: 1.  Monitor for areas of redness and/or skin breakdown  2.  Assess vascular access sites hourly  3.  Every 4-6 hours minimum:  Change oxygen saturation probe site  4.  Every 4-6 hours:  If on nasal continuous positive airway pressure, respiratory therapy assess nares and determine need for appliance change or resting period.  Outcome: Progressing     Problem: ABCDS Injury Assessment  Goal: Absence of physical injury  12/27/2024 1327 by Nimco Collins RN  Outcome: Progressing  12/27/2024 0116 by Felisa Nguyen RN  Outcome: Progressing     Problem: Physical Therapy - Adult  Goal: By Discharge: Performs mobility at highest level of function for planned discharge setting.  See evaluation for individualized goals.  Description: FUNCTIONAL STATUS PRIOR TO ADMISSION: patient ambulatory with  RW or quad cane.  Had caregiver M-F 8-2 and used to have night caregiver 9-7 but recently quit. Care giver assisted with bathing, mobility, meals.    HOME SUPPORT PRIOR TO ADMISSION: The patient lived alone with caregivers and son to provide assistance.    Physical Therapy Goals  Initiated 12/26/2024  1.  Patient will move from supine to sit and sit to supine and roll side to side in bed with supervision/set-up within 7 day(s).    2.  Patient will perform sit to stand with supervision/set-up within 7 day(s).  3.  Patient will transfer from bed to chair and chair to bed with supervision/set-up using the least restrictive device within 7 day(s).  4.

## 2024-12-28 ENCOUNTER — APPOINTMENT (OUTPATIENT)
Facility: HOSPITAL | Age: 89
End: 2024-12-28
Payer: MEDICARE

## 2024-12-28 VITALS
HEIGHT: 58 IN | OXYGEN SATURATION: 95 % | RESPIRATION RATE: 16 BRPM | WEIGHT: 110.3 LBS | HEART RATE: 85 BPM | DIASTOLIC BLOOD PRESSURE: 50 MMHG | TEMPERATURE: 98.8 F | BODY MASS INDEX: 23.15 KG/M2 | SYSTOLIC BLOOD PRESSURE: 148 MMHG

## 2024-12-28 LAB
ALBUMIN SERPL-MCNC: 2.7 G/DL (ref 3.5–5)
ALBUMIN/GLOB SERPL: 0.8 (ref 1.1–2.2)
ALP SERPL-CCNC: 78 U/L (ref 45–117)
ALT SERPL-CCNC: 24 U/L (ref 12–78)
ANION GAP SERPL CALC-SCNC: 5 MMOL/L (ref 2–12)
AST SERPL-CCNC: 31 U/L (ref 15–37)
BILIRUB SERPL-MCNC: 0.3 MG/DL (ref 0.2–1)
BUN SERPL-MCNC: 30 MG/DL (ref 6–20)
BUN/CREAT SERPL: 65 (ref 12–20)
CALCIUM SERPL-MCNC: 8.4 MG/DL (ref 8.5–10.1)
CHLORIDE SERPL-SCNC: 99 MMOL/L (ref 97–108)
CO2 SERPL-SCNC: 29 MMOL/L (ref 21–32)
CREAT SERPL-MCNC: 0.46 MG/DL (ref 0.55–1.02)
GLOBULIN SER CALC-MCNC: 3.3 G/DL (ref 2–4)
GLUCOSE SERPL-MCNC: 106 MG/DL (ref 65–100)
POTASSIUM SERPL-SCNC: 4 MMOL/L (ref 3.5–5.1)
PROT SERPL-MCNC: 6 G/DL (ref 6.4–8.2)
SODIUM SERPL-SCNC: 133 MMOL/L (ref 136–145)

## 2024-12-28 PROCEDURE — 80053 COMPREHEN METABOLIC PANEL: CPT

## 2024-12-28 PROCEDURE — 71045 X-RAY EXAM CHEST 1 VIEW: CPT

## 2024-12-28 PROCEDURE — 2500000003 HC RX 250 WO HCPCS: Performed by: FAMILY MEDICINE

## 2024-12-28 PROCEDURE — 6370000000 HC RX 637 (ALT 250 FOR IP): Performed by: INTERNAL MEDICINE

## 2024-12-28 PROCEDURE — 6370000000 HC RX 637 (ALT 250 FOR IP): Performed by: FAMILY MEDICINE

## 2024-12-28 PROCEDURE — 6370000000 HC RX 637 (ALT 250 FOR IP): Performed by: HOSPITALIST

## 2024-12-28 PROCEDURE — 6360000002 HC RX W HCPCS: Performed by: FAMILY MEDICINE

## 2024-12-28 RX ORDER — BENZONATATE 200 MG/1
200 CAPSULE ORAL 3 TIMES DAILY
Qty: 21 CAPSULE | Refills: 0 | Status: SHIPPED | OUTPATIENT
Start: 2024-12-28 | End: 2024-12-29

## 2024-12-28 RX ORDER — AZITHROMYCIN 500 MG/1
500 TABLET, FILM COATED ORAL DAILY
Qty: 1 TABLET | Refills: 0 | Status: SHIPPED | OUTPATIENT
Start: 2024-12-29 | End: 2024-12-30

## 2024-12-28 RX ADMIN — CALCIUM CARBONATE (ANTACID) CHEW TAB 500 MG 500 MG: 500 CHEW TAB at 08:31

## 2024-12-28 RX ADMIN — BENZONATATE 200 MG: 100 CAPSULE ORAL at 14:42

## 2024-12-28 RX ADMIN — Medication 1000 UNITS: at 08:31

## 2024-12-28 RX ADMIN — PANTOPRAZOLE SODIUM 40 MG: 40 TABLET, DELAYED RELEASE ORAL at 05:41

## 2024-12-28 RX ADMIN — METOPROLOL TARTRATE 100 MG: 50 TABLET, FILM COATED ORAL at 08:32

## 2024-12-28 RX ADMIN — LOSARTAN POTASSIUM 100 MG: 50 TABLET, FILM COATED ORAL at 08:31

## 2024-12-28 RX ADMIN — SODIUM CHLORIDE, PRESERVATIVE FREE 10 ML: 5 INJECTION INTRAVENOUS at 08:32

## 2024-12-28 RX ADMIN — FERROUS SULFATE TAB 325 MG (65 MG ELEMENTAL FE) 325 MG: 325 (65 FE) TAB at 08:32

## 2024-12-28 RX ADMIN — AZITHROMYCIN DIHYDRATE 500 MG: 250 TABLET, FILM COATED ORAL at 08:31

## 2024-12-28 RX ADMIN — ENOXAPARIN SODIUM 30 MG: 100 INJECTION SUBCUTANEOUS at 08:32

## 2024-12-28 RX ADMIN — BENZONATATE 200 MG: 100 CAPSULE ORAL at 08:32

## 2024-12-28 RX ADMIN — Medication 15 G: at 08:32

## 2024-12-28 ASSESSMENT — PAIN SCALES - GENERAL: PAINLEVEL_OUTOF10: 0

## 2024-12-28 NOTE — PROGRESS NOTES
[facility]  Servando Mejia MD  [phone]            Renal Progress Note    NAME:  Steve Gibbons   :   1931   MRN:   [id number]     Date/Time:  2024 1:33 PM            DISCUSSION / PLAN :      Assessment:    Hyponatremia probably due to diuretics and high water intake.  Urine osmolality is low which is consistent with using diuretics also can be due to high water intake.  -She is also on losartan which can cause hyponatremia.  -Hyponatremia improving with holding Lasix and fluid restriction-Na 133    Edema, low albumin-UA neg for protein-UPCR not sent yet. Waiting for result    Hypokalemia-resolved  Contraction alkalosis-resolved  HTN-not optimally controlled    Malnutrition?        Plan:  -Continue Urena 15 g daily  -Continue with current management  -Follow-up serum sodium  -Replete potassium prn  -Fluid restriction 1200 cc  - call if any questions on   -check UPCR-not sent yet, pending  Blood pressure management  Recommend dietary consult           ___________________________________________________  Subjective:         Unable to communicate with patient even with .  Spoke to son yesterday on phone.  She used to drink plenty of water.    24 Sitting up in chair. Did not acknowledge my presence. Does not appear to be in any distress.    24 No new issues. Na is normalized    Medications reviewed:  Current Facility-Administered Medications   Medication Dose Route Frequency    urea (URE-NA) packet 15 g  15 g Oral Daily    losartan (COZAAR) tablet 100 mg  100 mg Oral Daily    ferrous sulfate (IRON 325) tablet 325 mg  325 mg Oral Daily with breakfast    metoprolol tartrate (LOPRESSOR) tablet 100 mg  100 mg Oral BID    calcium carbonate (TUMS) chewable tablet 500 mg  500 mg Oral Daily    Vitamin D (CHOLECALCIFEROL) tablet 1,000 Units  1,000 Units Oral Daily    pantoprazole (PROTONIX) tablet 40 mg  40 mg Oral QAM AC    benzonatate (TESSALON) capsule 200 mg  200 mg Oral    HGB 10.0* 10.6*   HCT 29.2* 31.1*    244     No results for input(s): \"KU\", \"CLU\" in the last 720 hours.    Invalid input(s): \"NIKHIL\", \"CREAU\", \"PROU\"        Total time spent with patient:  [] 15   [] 25   [x] 35   []  __ minutes    [] Critical Care Provided    Care Plan discussed with:    [] Patient   [] Family    [] Care Manager   [] Consultant/Specialist :      []   >50% of visit spent in counseling and coordination of care   (Discussed [] CODE status,  [] Care Plan, [] D/C Planning)    ___________________________________________________    Attending Physician: Servando Mejia MD    [facility]

## 2024-12-28 NOTE — PLAN OF CARE
Discharge order received, pt notified and agreeable. PIV removed, site dressed - clean, dry and intact. AVS printed and reviewed with pt and family member. Opportunity for questions to be asked and answered provided. Pt belongings including AVS packed and pt assisted with ADLs. Pt escorted via wheelchair downstairs for discharge and pt left in no apparent distress.       Problem: Safety - Adult  Goal: Free from fall injury  12/28/2024 1109 by Nimco Collins RN  Outcome: Progressing  12/27/2024 2236 by Felisa Nguyen RN  Outcome: Progressing     Problem: Discharge Planning  Goal: Discharge to home or other facility with appropriate resources  12/28/2024 1109 by Nimco Collins RN  Outcome: Progressing  12/27/2024 2236 by Felisa Nguyen RN  Outcome: Progressing     Problem: Skin/Tissue Integrity  Goal: Absence of new skin breakdown  Description: 1.  Monitor for areas of redness and/or skin breakdown  2.  Assess vascular access sites hourly  3.  Every 4-6 hours minimum:  Change oxygen saturation probe site  4.  Every 4-6 hours:  If on nasal continuous positive airway pressure, respiratory therapy assess nares and determine need for appliance change or resting period.  12/28/2024 1109 by Nimco Collins RN  Outcome: Progressing  12/27/2024 2236 by Felisa Nguyen RN  Outcome: Progressing     Problem: ABCDS Injury Assessment  Goal: Absence of physical injury  Outcome: Progressing

## 2024-12-28 NOTE — DISCHARGE INSTRUCTIONS
Discharge Instructions       PATIENT ID: Jose Raul-Whitney Gibbons  MRN: [id number]   YOB: 1931    DATE OF ADMISSION: 12/24/2024   DATE OF DISCHARGE: 12/28/2024    PRIMARY CARE PROVIDER: Rsoa Alvarado     ATTENDING PHYSICIAN: Vania Chisholm MD   DISCHARGING PROVIDER: Vania Chisholm MD    To contact this individual call [phone] and ask the  to page.   If unavailable ask to be transferred the Adult Hospitalist Department.    DISCHARGE DIAGNOSES    Acute hyponatremia  Bilateral leg edema possible due to low albumin and venous insufficiency  Altered mental status, Confusion likely due to Acute metabolic encephalopathy   Cough due to Acute bronchitis   Normocytic normochromic anemia  LFT elevation  HTN  Dementia  Hx of right neck mass  Generalized weakness    CONSULTATIONS: [unfilled]    PROCEDURES/SURGERIES: * No surgery found *    PENDING TEST RESULTS:   At the time of discharge the following test results are still pending: None     FOLLOW UP APPOINTMENTS:   Rosa Alvarado MD in one week   Leahta Rubi MD, [facility] [[email or web address]  in 2 week     ADDITIONAL CARE RECOMMENDATIONS:      DIET: regular diet, Fluid Restriction 1200 cc over 24 hrs    ACTIVITY: activity as tolerated    WOUND CARE: None     EQUIPMENT needed: None       DISCHARGE MEDICATIONS:   See Medication Reconciliation Form    It is important that you take the medication exactly as they are prescribed.   Keep your medication in the bottles provided by the pharmacist and keep a list of the medication names, dosages, and times to be taken in your wallet.   Do not take other medications without consulting your doctor.       NOTIFY YOUR PHYSICIAN FOR ANY OF THE FOLLOWING:   Fever over 101 degrees for 24 hours.   Chest pain, shortness of breath, fever, chills, nausea, vomiting, diarrhea, change in mentation, falling, weakness, bleeding. Severe pain or pain not relieved by medications.  Or, any other

## 2024-12-28 NOTE — DISCHARGE SUMMARY
Discharge Summary       PATIENT ID: Steve Gibbons  MRN: [id number]   YOB: 1931    DATE OF ADMISSION: 12/24/2024  6:44 PM    DATE OF DISCHARGE: 12/28/2024   PRIMARY CARE PROVIDER: Rosa Alvarado MD     ATTENDING PHYSICIAN:  Vania Chihsolm MD  DISCHARGING PROVIDER: Vania Chisholm MD    To contact this individual call [phone] and ask the  to page.  If unavailable ask to be transferred the Adult Hospitalist Department.    CONSULTATIONS:   IP CONSULT TO HOSPITALIST  IP CONSULT TO NEPHROLOGY  IP CONSULT HOME HEALTH    PROCEDURES/SURGERIES: * No surgery found *    ADMITTING DIAGNOSES & HOSPITAL COURSE:     \"Steve Gibbons is a 89 y.o. female with past medical history of hypertension presented to emergency department with reported low sodium, cough, swelling in both legs, and confusion.  Patient reportedly has been intermittently confused over the past 2 months.  She newly had cough over the past 2 weeks.  She reports recent swelling in both legs.  She had been seen by her PCP last week and was started on Lasix for duration of 5 days of treatment.  Symptoms notedly remain unresolved.  Today, she reported had labs showing sodium 118.  She was referred to the emergency department for further evaluation.  On arrival in the ED, initial reported vital signs were temperature 98.8 °F, /64, heart rate 76, respiratory rate 17, O2 saturation 98% on room air.  COVID- 19 and influenza test were negative.  Abnormal labs included sodium 121, chloride 88, BUN 25, creatinine is 0.50, blood glucose 147, calcium 8.4, albumin 2.9, AST 48, proBNP 2650, hemoglobin 10.6, hematocrit 31.1.  Patient is now seen for admission to the hospitalist service.  In addition, patient's son incidentally notes that patient has a mass on right side of neck which was enlarged about 1 month ago but now has decreased in size.  He notes the patient is followed as outpatient by [facility] home health service and was seen by

## 2024-12-28 NOTE — PLAN OF CARE
Problem: Safety - Adult  Goal: Free from fall injury  12/27/2024 2236 by Felisa Nguyen, RN  Outcome: Progressing     Problem: Discharge Planning  Goal: Discharge to home or other facility with appropriate resources  12/27/2024 2236 by Felisa Nguyen, RN  Outcome: Progressing     Problem: Skin/Tissue Integrity  Goal: Absence of new skin breakdown  Description: 1.  Monitor for areas of redness and/or skin breakdown  2.  Assess vascular access sites hourly

## 2024-12-29 RX ORDER — BENZONATATE 200 MG/1
200 CAPSULE ORAL 3 TIMES DAILY
Qty: 21 CAPSULE | Refills: 0 | Status: SHIPPED | OUTPATIENT
Start: 2024-12-29 | End: 2025-01-05